# Patient Record
Sex: MALE | Race: WHITE | NOT HISPANIC OR LATINO | Employment: FULL TIME | URBAN - METROPOLITAN AREA
[De-identification: names, ages, dates, MRNs, and addresses within clinical notes are randomized per-mention and may not be internally consistent; named-entity substitution may affect disease eponyms.]

---

## 2017-04-04 ENCOUNTER — RECORDS - HEALTHEAST (OUTPATIENT)
Dept: LAB | Facility: CLINIC | Age: 52
End: 2017-04-04

## 2017-04-05 LAB
1ST CONSTITUENT:: NORMAL
SOURCE: NORMAL

## 2018-01-10 ENCOUNTER — RECORDS - HEALTHEAST (OUTPATIENT)
Dept: LAB | Facility: CLINIC | Age: 53
End: 2018-01-10

## 2018-01-10 LAB
ALBUMIN SERPL-MCNC: 3.8 G/DL (ref 3.5–5)
ALP SERPL-CCNC: 108 U/L (ref 45–120)
ALT SERPL W P-5'-P-CCNC: 25 U/L (ref 0–45)
ANION GAP SERPL CALCULATED.3IONS-SCNC: 11 MMOL/L (ref 5–18)
AST SERPL W P-5'-P-CCNC: 25 U/L (ref 0–40)
BILIRUB SERPL-MCNC: 0.6 MG/DL (ref 0–1)
BUN SERPL-MCNC: 16 MG/DL (ref 8–22)
CALCIUM SERPL-MCNC: 9.8 MG/DL (ref 8.5–10.5)
CHLORIDE BLD-SCNC: 106 MMOL/L (ref 98–107)
CHOLEST SERPL-MCNC: 181 MG/DL
CO2 SERPL-SCNC: 26 MMOL/L (ref 22–31)
CREAT SERPL-MCNC: 0.97 MG/DL (ref 0.7–1.3)
FASTING STATUS PATIENT QL REPORTED: NORMAL
GFR SERPL CREATININE-BSD FRML MDRD: >60 ML/MIN/1.73M2
GLUCOSE BLD-MCNC: 90 MG/DL (ref 70–125)
HDLC SERPL-MCNC: 40 MG/DL
LDLC SERPL CALC-MCNC: 114 MG/DL
POTASSIUM BLD-SCNC: 4.1 MMOL/L (ref 3.5–5)
PROT SERPL-MCNC: 7.8 G/DL (ref 6–8)
SODIUM SERPL-SCNC: 143 MMOL/L (ref 136–145)
TRIGL SERPL-MCNC: 134 MG/DL
URATE SERPL-MCNC: 7.4 MG/DL (ref 3–8)

## 2018-06-21 ENCOUNTER — RECORDS - HEALTHEAST (OUTPATIENT)
Dept: LAB | Facility: CLINIC | Age: 53
End: 2018-06-21

## 2018-06-21 ENCOUNTER — TRANSFERRED RECORDS (OUTPATIENT)
Dept: HEALTH INFORMATION MANAGEMENT | Facility: CLINIC | Age: 53
End: 2018-06-21

## 2018-06-21 LAB
ALBUMIN SERPL-MCNC: 3.9 G/DL (ref 3.5–5)
ALP SERPL-CCNC: 99 U/L (ref 45–120)
ALT SERPL W P-5'-P-CCNC: 78 U/L (ref 0–45)
ANION GAP SERPL CALCULATED.3IONS-SCNC: 11 MMOL/L (ref 5–18)
AST SERPL W P-5'-P-CCNC: 33 U/L (ref 0–40)
BILIRUB SERPL-MCNC: 0.4 MG/DL (ref 0–1)
BUN SERPL-MCNC: 13 MG/DL (ref 8–22)
CALCIUM SERPL-MCNC: 9.6 MG/DL (ref 8.5–10.5)
CHLORIDE BLD-SCNC: 106 MMOL/L (ref 98–107)
CO2 SERPL-SCNC: 22 MMOL/L (ref 22–31)
CREAT SERPL-MCNC: 0.88 MG/DL (ref 0.7–1.3)
GFR SERPL CREATININE-BSD FRML MDRD: >60 ML/MIN/1.73M2
GLUCOSE BLD-MCNC: 101 MG/DL (ref 70–125)
POTASSIUM BLD-SCNC: 4 MMOL/L (ref 3.5–5)
PROT SERPL-MCNC: 7.6 G/DL (ref 6–8)
SODIUM SERPL-SCNC: 139 MMOL/L (ref 136–145)
TSH SERPL DL<=0.005 MIU/L-ACNC: 0.6 UIU/ML (ref 0.3–5)

## 2018-06-23 LAB — ALDOST SERPL-MCNC: 13.6 NG/DL

## 2018-06-24 LAB
ANNOTATION COMMENT IMP: NORMAL
METANEPHS SERPL-SCNC: 0.26 NMOL/L (ref 0–0.49)
NORMETANEPHRINE SERPL-SCNC: 0.61 NMOL/L (ref 0–0.89)

## 2018-07-17 ENCOUNTER — RECORDS - HEALTHEAST (OUTPATIENT)
Dept: LAB | Facility: CLINIC | Age: 53
End: 2018-07-17

## 2018-07-17 LAB
ANION GAP SERPL CALCULATED.3IONS-SCNC: 12 MMOL/L (ref 5–18)
BUN SERPL-MCNC: 23 MG/DL (ref 8–22)
CALCIUM SERPL-MCNC: 10 MG/DL (ref 8.5–10.5)
CHLORIDE BLD-SCNC: 106 MMOL/L (ref 98–107)
CO2 SERPL-SCNC: 24 MMOL/L (ref 22–31)
CREAT SERPL-MCNC: 1.14 MG/DL (ref 0.7–1.3)
GFR SERPL CREATININE-BSD FRML MDRD: >60 ML/MIN/1.73M2
GLUCOSE BLD-MCNC: 97 MG/DL (ref 70–125)
POTASSIUM BLD-SCNC: 4 MMOL/L (ref 3.5–5)
SODIUM SERPL-SCNC: 142 MMOL/L (ref 136–145)

## 2019-01-22 ENCOUNTER — RECORDS - HEALTHEAST (OUTPATIENT)
Dept: LAB | Facility: CLINIC | Age: 54
End: 2019-01-22

## 2019-01-22 LAB
ALBUMIN SERPL-MCNC: 4 G/DL (ref 3.5–5)
ALP SERPL-CCNC: 82 U/L (ref 45–120)
ALT SERPL W P-5'-P-CCNC: 71 U/L (ref 0–45)
ANION GAP SERPL CALCULATED.3IONS-SCNC: 13 MMOL/L (ref 5–18)
AST SERPL W P-5'-P-CCNC: 36 U/L (ref 0–40)
BILIRUB SERPL-MCNC: 0.4 MG/DL (ref 0–1)
BUN SERPL-MCNC: 21 MG/DL (ref 8–22)
CALCIUM SERPL-MCNC: 9.8 MG/DL (ref 8.5–10.5)
CHLORIDE BLD-SCNC: 106 MMOL/L (ref 98–107)
CHOLEST SERPL-MCNC: 215 MG/DL
CO2 SERPL-SCNC: 25 MMOL/L (ref 22–31)
CREAT SERPL-MCNC: 1.21 MG/DL (ref 0.7–1.3)
FASTING STATUS PATIENT QL REPORTED: ABNORMAL
GFR SERPL CREATININE-BSD FRML MDRD: >60 ML/MIN/1.73M2
GLUCOSE BLD-MCNC: 87 MG/DL (ref 70–125)
HDLC SERPL-MCNC: 35 MG/DL
LDLC SERPL CALC-MCNC: 111 MG/DL
MAGNESIUM SERPL-MCNC: 2.5 MG/DL (ref 1.8–2.6)
POTASSIUM BLD-SCNC: 4 MMOL/L (ref 3.5–5)
PROT SERPL-MCNC: 7.8 G/DL (ref 6–8)
PSA SERPL-MCNC: 0.5 NG/ML (ref 0–3.5)
SODIUM SERPL-SCNC: 144 MMOL/L (ref 136–145)
TRIGL SERPL-MCNC: 346 MG/DL
URATE SERPL-MCNC: 11.1 MG/DL (ref 3–8)

## 2019-04-26 ENCOUNTER — RECORDS - HEALTHEAST (OUTPATIENT)
Dept: LAB | Facility: CLINIC | Age: 54
End: 2019-04-26

## 2019-04-26 LAB
ANION GAP SERPL CALCULATED.3IONS-SCNC: 11 MMOL/L (ref 5–18)
BUN SERPL-MCNC: 20 MG/DL (ref 8–22)
CALCIUM SERPL-MCNC: 10.2 MG/DL (ref 8.5–10.5)
CHLORIDE BLD-SCNC: 106 MMOL/L (ref 98–107)
CO2 SERPL-SCNC: 27 MMOL/L (ref 22–31)
CREAT SERPL-MCNC: 1.3 MG/DL (ref 0.7–1.3)
GFR SERPL CREATININE-BSD FRML MDRD: 57 ML/MIN/1.73M2
GLUCOSE BLD-MCNC: 89 MG/DL (ref 70–125)
POTASSIUM BLD-SCNC: 4.4 MMOL/L (ref 3.5–5)
SODIUM SERPL-SCNC: 144 MMOL/L (ref 136–145)

## 2019-05-28 ENCOUNTER — RECORDS - HEALTHEAST (OUTPATIENT)
Dept: LAB | Facility: CLINIC | Age: 54
End: 2019-05-28

## 2019-05-28 LAB — URATE SERPL-MCNC: 10.1 MG/DL (ref 3–8)

## 2019-11-08 ENCOUNTER — TRANSFERRED RECORDS (OUTPATIENT)
Dept: HEALTH INFORMATION MANAGEMENT | Facility: CLINIC | Age: 54
End: 2019-11-08

## 2021-04-26 ENCOUNTER — RECORDS - HEALTHEAST (OUTPATIENT)
Dept: LAB | Facility: CLINIC | Age: 56
End: 2021-04-26

## 2021-04-26 LAB
ANION GAP SERPL CALCULATED.3IONS-SCNC: 14 MMOL/L (ref 5–18)
BUN SERPL-MCNC: 20 MG/DL (ref 8–22)
CALCIUM SERPL-MCNC: 9.5 MG/DL (ref 8.5–10.5)
CHLORIDE BLD-SCNC: 103 MMOL/L (ref 98–107)
CHOLEST SERPL-MCNC: 199 MG/DL
CO2 SERPL-SCNC: 22 MMOL/L (ref 22–31)
CREAT SERPL-MCNC: 1.13 MG/DL (ref 0.7–1.3)
FASTING STATUS PATIENT QL REPORTED: ABNORMAL
GFR SERPL CREATININE-BSD FRML MDRD: >60 ML/MIN/1.73M2
GLUCOSE BLD-MCNC: 131 MG/DL (ref 70–125)
HDLC SERPL-MCNC: 37 MG/DL
LDLC SERPL CALC-MCNC: 114 MG/DL
POTASSIUM BLD-SCNC: 3.5 MMOL/L (ref 3.5–5)
PSA SERPL-MCNC: 0.8 NG/ML (ref 0–3.5)
SODIUM SERPL-SCNC: 139 MMOL/L (ref 136–145)
TRIGL SERPL-MCNC: 240 MG/DL
URATE SERPL-MCNC: 6 MG/DL (ref 3–8)

## 2022-04-11 ENCOUNTER — TRANSFERRED RECORDS (OUTPATIENT)
Dept: HEALTH INFORMATION MANAGEMENT | Facility: CLINIC | Age: 57
End: 2022-04-11

## 2022-04-11 ENCOUNTER — LAB REQUISITION (OUTPATIENT)
Dept: LAB | Facility: CLINIC | Age: 57
End: 2022-04-11

## 2022-04-11 DIAGNOSIS — Z12.5 ENCOUNTER FOR SCREENING FOR MALIGNANT NEOPLASM OF PROSTATE: ICD-10-CM

## 2022-04-11 DIAGNOSIS — Z87.39 PERSONAL HISTORY OF OTHER DISEASES OF THE MUSCULOSKELETAL SYSTEM AND CONNECTIVE TISSUE: ICD-10-CM

## 2022-04-11 DIAGNOSIS — I12.9 HYPERTENSIVE CHRONIC KIDNEY DISEASE WITH STAGE 1 THROUGH STAGE 4 CHRONIC KIDNEY DISEASE, OR UNSPECIFIED CHRONIC KIDNEY DISEASE: ICD-10-CM

## 2022-04-11 LAB
ALBUMIN SERPL-MCNC: 3.9 G/DL (ref 3.5–5)
ALP SERPL-CCNC: 96 U/L (ref 45–120)
ALT SERPL W P-5'-P-CCNC: 27 U/L (ref 0–45)
ANION GAP SERPL CALCULATED.3IONS-SCNC: 9 MMOL/L (ref 5–18)
AST SERPL W P-5'-P-CCNC: 26 U/L (ref 0–40)
BILIRUB SERPL-MCNC: 0.5 MG/DL (ref 0–1)
BUN SERPL-MCNC: 21 MG/DL (ref 8–22)
CALCIUM SERPL-MCNC: 10 MG/DL (ref 8.5–10.5)
CHLORIDE BLD-SCNC: 102 MMOL/L (ref 98–107)
CHOLEST SERPL-MCNC: 178 MG/DL
CO2 SERPL-SCNC: 29 MMOL/L (ref 22–31)
CREAT SERPL-MCNC: 1.13 MG/DL (ref 0.7–1.3)
FASTING STATUS PATIENT QL REPORTED: ABNORMAL
GFR SERPL CREATININE-BSD FRML MDRD: 75 ML/MIN/1.73M2
GLUCOSE BLD-MCNC: 123 MG/DL (ref 70–125)
HDLC SERPL-MCNC: 37 MG/DL
LDLC SERPL CALC-MCNC: 83 MG/DL
POTASSIUM BLD-SCNC: 4.2 MMOL/L (ref 3.5–5)
PROT SERPL-MCNC: 7.3 G/DL (ref 6–8)
PSA SERPL-MCNC: 0.55 UG/L (ref 0–3.5)
SODIUM SERPL-SCNC: 140 MMOL/L (ref 136–145)
TRIGL SERPL-MCNC: 292 MG/DL
URATE SERPL-MCNC: 5.8 MG/DL (ref 3–8)

## 2022-04-11 PROCEDURE — G0103 PSA SCREENING: HCPCS | Performed by: FAMILY MEDICINE

## 2022-04-11 PROCEDURE — 80053 COMPREHEN METABOLIC PANEL: CPT | Performed by: FAMILY MEDICINE

## 2022-04-11 PROCEDURE — 84550 ASSAY OF BLOOD/URIC ACID: CPT | Performed by: FAMILY MEDICINE

## 2022-04-11 PROCEDURE — 80061 LIPID PANEL: CPT | Performed by: FAMILY MEDICINE

## 2022-11-06 ENCOUNTER — HOSPITAL ENCOUNTER (INPATIENT)
Facility: HOSPITAL | Age: 57
LOS: 1 days | Discharge: HOME | End: 2022-11-08
Attending: EMERGENCY MEDICINE | Admitting: HOSPITALIST
Payer: COMMERCIAL

## 2022-11-06 ENCOUNTER — APPOINTMENT (OUTPATIENT)
Dept: RADIOLOGY | Facility: HOSPITAL | Age: 57
End: 2022-11-06
Attending: EMERGENCY MEDICINE
Payer: COMMERCIAL

## 2022-11-06 DIAGNOSIS — I21.3 ST ELEVATION MI (STEMI) (H): ICD-10-CM

## 2022-11-06 DIAGNOSIS — I21.3 ST ELEVATION MYOCARDIAL INFARCTION (STEMI), UNSPECIFIED ARTERY (H): ICD-10-CM

## 2022-11-06 LAB
ACT BLD: 237 SECONDS (ref 74–150)
ACT BLD: 254 SECONDS (ref 74–150)
ACT BLD: 297 SECONDS (ref 74–150)
ACT BLD: 318 SECONDS (ref 74–150)
ANION GAP SERPL CALCULATED.3IONS-SCNC: 12 MMOL/L (ref 7–15)
ANION GAP SERPL CALCULATED.3IONS-SCNC: 13 MMOL/L (ref 7–15)
APTT PPP: 80 SECONDS (ref 22–38)
BASOPHILS # BLD AUTO: 0 10E3/UL (ref 0–0.2)
BASOPHILS # BLD AUTO: 0.1 10E3/UL (ref 0–0.2)
BASOPHILS NFR BLD AUTO: 0 %
BASOPHILS NFR BLD AUTO: 0 %
BUN SERPL-MCNC: 16.1 MG/DL (ref 6–20)
BUN SERPL-MCNC: 16.6 MG/DL (ref 6–20)
CALCIUM SERPL-MCNC: 9.1 MG/DL (ref 8.6–10)
CALCIUM SERPL-MCNC: 9.5 MG/DL (ref 8.6–10)
CHLORIDE SERPL-SCNC: 100 MMOL/L (ref 98–107)
CHLORIDE SERPL-SCNC: 100 MMOL/L (ref 98–107)
CREAT SERPL-MCNC: 0.98 MG/DL (ref 0.67–1.17)
CREAT SERPL-MCNC: 1.09 MG/DL (ref 0.67–1.17)
DEPRECATED HCO3 PLAS-SCNC: 24 MMOL/L (ref 22–29)
DEPRECATED HCO3 PLAS-SCNC: 24 MMOL/L (ref 22–29)
EOSINOPHIL # BLD AUTO: 0 10E3/UL (ref 0–0.7)
EOSINOPHIL # BLD AUTO: 0 10E3/UL (ref 0–0.7)
EOSINOPHIL NFR BLD AUTO: 0 %
EOSINOPHIL NFR BLD AUTO: 0 %
ERYTHROCYTE [DISTWIDTH] IN BLOOD BY AUTOMATED COUNT: 12.4 % (ref 10–15)
ERYTHROCYTE [DISTWIDTH] IN BLOOD BY AUTOMATED COUNT: 12.7 % (ref 10–15)
GFR SERPL CREATININE-BSD FRML MDRD: 79 ML/MIN/1.73M2
GFR SERPL CREATININE-BSD FRML MDRD: 89 ML/MIN/1.73M2
GLUCOSE SERPL-MCNC: 122 MG/DL (ref 70–99)
GLUCOSE SERPL-MCNC: 149 MG/DL (ref 70–99)
HCT VFR BLD AUTO: 41.7 % (ref 40–53)
HCT VFR BLD AUTO: 45.3 % (ref 40–53)
HGB BLD-MCNC: 14 G/DL (ref 13.3–17.7)
HGB BLD-MCNC: 15.6 G/DL (ref 13.3–17.7)
HOLD SPECIMEN: NORMAL
HOLD SPECIMEN: NORMAL
IMM GRANULOCYTES # BLD: 0 10E3/UL
IMM GRANULOCYTES # BLD: 0.1 10E3/UL
IMM GRANULOCYTES NFR BLD: 0 %
IMM GRANULOCYTES NFR BLD: 1 %
INR PPP: 1.15 (ref 0.85–1.15)
LYMPHOCYTES # BLD AUTO: 1.9 10E3/UL (ref 0.8–5.3)
LYMPHOCYTES # BLD AUTO: 2.3 10E3/UL (ref 0.8–5.3)
LYMPHOCYTES NFR BLD AUTO: 12 %
LYMPHOCYTES NFR BLD AUTO: 16 %
MAGNESIUM SERPL-MCNC: 1.7 MG/DL (ref 1.7–2.3)
MCH RBC QN AUTO: 30.6 PG (ref 26.5–33)
MCH RBC QN AUTO: 30.8 PG (ref 26.5–33)
MCHC RBC AUTO-ENTMCNC: 33.6 G/DL (ref 31.5–36.5)
MCHC RBC AUTO-ENTMCNC: 34.4 G/DL (ref 31.5–36.5)
MCV RBC AUTO: 89 FL (ref 78–100)
MCV RBC AUTO: 92 FL (ref 78–100)
MONOCYTES # BLD AUTO: 0.6 10E3/UL (ref 0–1.3)
MONOCYTES # BLD AUTO: 1.2 10E3/UL (ref 0–1.3)
MONOCYTES NFR BLD AUTO: 5 %
MONOCYTES NFR BLD AUTO: 6 %
NEUTROPHILS # BLD AUTO: 14.8 10E3/UL (ref 1.6–8.3)
NEUTROPHILS # BLD AUTO: 9.3 10E3/UL (ref 1.6–8.3)
NEUTROPHILS NFR BLD AUTO: 79 %
NEUTROPHILS NFR BLD AUTO: 81 %
NRBC # BLD AUTO: 0 10E3/UL
NRBC # BLD AUTO: 0 10E3/UL
NRBC BLD AUTO-RTO: 0 /100
NRBC BLD AUTO-RTO: 0 /100
PLATELET # BLD AUTO: 239 10E3/UL (ref 150–450)
PLATELET # BLD AUTO: 252 10E3/UL (ref 150–450)
POTASSIUM SERPL-SCNC: 3.3 MMOL/L (ref 3.4–5.3)
POTASSIUM SERPL-SCNC: 3.5 MMOL/L (ref 3.4–5.3)
RBC # BLD AUTO: 4.55 10E6/UL (ref 4.4–5.9)
RBC # BLD AUTO: 5.09 10E6/UL (ref 4.4–5.9)
SODIUM SERPL-SCNC: 136 MMOL/L (ref 136–145)
SODIUM SERPL-SCNC: 137 MMOL/L (ref 136–145)
TROPONIN T SERPL HS-MCNC: 115 NG/L
TROPONIN T SERPL HS-MCNC: 312 NG/L
TROPONIN T SERPL HS-MCNC: 39 NG/L
TROPONIN T SERPL HS-MCNC: 972 NG/L
WBC # BLD AUTO: 11.9 10E3/UL (ref 4–11)
WBC # BLD AUTO: 18.3 10E3/UL (ref 4–11)

## 2022-11-06 PROCEDURE — 36415 COLL VENOUS BLD VENIPUNCTURE: CPT | Performed by: EMERGENCY MEDICINE

## 2022-11-06 PROCEDURE — 84484 ASSAY OF TROPONIN QUANT: CPT | Performed by: EMERGENCY MEDICINE

## 2022-11-06 PROCEDURE — 99223 1ST HOSP IP/OBS HIGH 75: CPT | Mod: AI | Performed by: HOSPITALIST

## 2022-11-06 PROCEDURE — 92941 PRQ TRLML REVSC TOT OCCL AMI: CPT | Mod: RC | Performed by: INTERNAL MEDICINE

## 2022-11-06 PROCEDURE — 250N000011 HC RX IP 250 OP 636: Performed by: EMERGENCY MEDICINE

## 2022-11-06 PROCEDURE — 93458 L HRT ARTERY/VENTRICLE ANGIO: CPT | Mod: XU | Performed by: INTERNAL MEDICINE

## 2022-11-06 PROCEDURE — 84484 ASSAY OF TROPONIN QUANT: CPT | Performed by: INTERNAL MEDICINE

## 2022-11-06 PROCEDURE — 85347 COAGULATION TIME ACTIVATED: CPT

## 2022-11-06 PROCEDURE — 4A023N7 MEASUREMENT OF CARDIAC SAMPLING AND PRESSURE, LEFT HEART, PERCUTANEOUS APPROACH: ICD-10-PCS | Performed by: INTERNAL MEDICINE

## 2022-11-06 PROCEDURE — 250N000013 HC RX MED GY IP 250 OP 250 PS 637: Performed by: EMERGENCY MEDICINE

## 2022-11-06 PROCEDURE — 93005 ELECTROCARDIOGRAM TRACING: CPT | Performed by: STUDENT IN AN ORGANIZED HEALTH CARE EDUCATION/TRAINING PROGRAM

## 2022-11-06 PROCEDURE — 250N000009 HC RX 250: Performed by: INTERNAL MEDICINE

## 2022-11-06 PROCEDURE — 255N000002 HC RX 255 OP 636: Performed by: INTERNAL MEDICINE

## 2022-11-06 PROCEDURE — 93010 ELECTROCARDIOGRAM REPORT: CPT | Performed by: INTERNAL MEDICINE

## 2022-11-06 PROCEDURE — C1887 CATHETER, GUIDING: HCPCS | Performed by: INTERNAL MEDICINE

## 2022-11-06 PROCEDURE — 92978 ENDOLUMINL IVUS OCT C 1ST: CPT | Mod: 26 | Performed by: INTERNAL MEDICINE

## 2022-11-06 PROCEDURE — 83735 ASSAY OF MAGNESIUM: CPT | Performed by: HOSPITALIST

## 2022-11-06 PROCEDURE — 71045 X-RAY EXAM CHEST 1 VIEW: CPT

## 2022-11-06 PROCEDURE — C9606 PERC D-E COR REVASC W AMI S: HCPCS | Mod: RC | Performed by: INTERNAL MEDICINE

## 2022-11-06 PROCEDURE — 93005 ELECTROCARDIOGRAM TRACING: CPT | Performed by: EMERGENCY MEDICINE

## 2022-11-06 PROCEDURE — C1753 CATH, INTRAVAS ULTRASOUND: HCPCS | Performed by: INTERNAL MEDICINE

## 2022-11-06 PROCEDURE — B241ZZ3 ULTRASONOGRAPHY OF MULTIPLE CORONARY ARTERIES, INTRAVASCULAR: ICD-10-PCS | Performed by: INTERNAL MEDICINE

## 2022-11-06 PROCEDURE — 99152 MOD SED SAME PHYS/QHP 5/>YRS: CPT | Performed by: INTERNAL MEDICINE

## 2022-11-06 PROCEDURE — 93005 ELECTROCARDIOGRAM TRACING: CPT | Performed by: INTERNAL MEDICINE

## 2022-11-06 PROCEDURE — C1725 CATH, TRANSLUMIN NON-LASER: HCPCS | Performed by: INTERNAL MEDICINE

## 2022-11-06 PROCEDURE — G0378 HOSPITAL OBSERVATION PER HR: HCPCS

## 2022-11-06 PROCEDURE — 250N000011 HC RX IP 250 OP 636: Performed by: INTERNAL MEDICINE

## 2022-11-06 PROCEDURE — C9803 HOPD COVID-19 SPEC COLLECT: HCPCS

## 2022-11-06 PROCEDURE — 36415 COLL VENOUS BLD VENIPUNCTURE: CPT | Performed by: INTERNAL MEDICINE

## 2022-11-06 PROCEDURE — C1757 CATH, THROMBECTOMY/EMBOLECT: HCPCS | Performed by: INTERNAL MEDICINE

## 2022-11-06 PROCEDURE — 93010 ELECTROCARDIOGRAM REPORT: CPT | Mod: 76 | Performed by: INTERNAL MEDICINE

## 2022-11-06 PROCEDURE — 99285 EMERGENCY DEPT VISIT HI MDM: CPT | Mod: 25

## 2022-11-06 PROCEDURE — C1894 INTRO/SHEATH, NON-LASER: HCPCS | Performed by: INTERNAL MEDICINE

## 2022-11-06 PROCEDURE — 99153 MOD SED SAME PHYS/QHP EA: CPT | Performed by: INTERNAL MEDICINE

## 2022-11-06 PROCEDURE — 85025 COMPLETE CBC W/AUTO DIFF WBC: CPT | Performed by: EMERGENCY MEDICINE

## 2022-11-06 PROCEDURE — C1769 GUIDE WIRE: HCPCS | Performed by: INTERNAL MEDICINE

## 2022-11-06 PROCEDURE — 93458 L HRT ARTERY/VENTRICLE ANGIO: CPT | Performed by: INTERNAL MEDICINE

## 2022-11-06 PROCEDURE — 272N000001 HC OR GENERAL SUPPLY STERILE: Performed by: INTERNAL MEDICINE

## 2022-11-06 PROCEDURE — 85610 PROTHROMBIN TIME: CPT | Performed by: EMERGENCY MEDICINE

## 2022-11-06 PROCEDURE — 36415 COLL VENOUS BLD VENIPUNCTURE: CPT | Performed by: HOSPITALIST

## 2022-11-06 PROCEDURE — 85730 THROMBOPLASTIN TIME PARTIAL: CPT | Performed by: EMERGENCY MEDICINE

## 2022-11-06 PROCEDURE — 027035Z DILATION OF CORONARY ARTERY, ONE ARTERY WITH TWO DRUG-ELUTING INTRALUMINAL DEVICES, PERCUTANEOUS APPROACH: ICD-10-PCS | Performed by: INTERNAL MEDICINE

## 2022-11-06 PROCEDURE — 82310 ASSAY OF CALCIUM: CPT | Performed by: EMERGENCY MEDICINE

## 2022-11-06 PROCEDURE — 250N000011 HC RX IP 250 OP 636: Performed by: HOSPITALIST

## 2022-11-06 PROCEDURE — 93005 ELECTROCARDIOGRAM TRACING: CPT

## 2022-11-06 PROCEDURE — 92978 ENDOLUMINL IVUS OCT C 1ST: CPT | Mod: RC | Performed by: INTERNAL MEDICINE

## 2022-11-06 PROCEDURE — 250N000013 HC RX MED GY IP 250 OP 250 PS 637: Performed by: INTERNAL MEDICINE

## 2022-11-06 PROCEDURE — B211YZZ FLUOROSCOPY OF MULTIPLE CORONARY ARTERIES USING OTHER CONTRAST: ICD-10-PCS | Performed by: INTERNAL MEDICINE

## 2022-11-06 PROCEDURE — 96374 THER/PROPH/DIAG INJ IV PUSH: CPT

## 2022-11-06 PROCEDURE — C1874 STENT, COATED/COV W/DEL SYS: HCPCS | Performed by: INTERNAL MEDICINE

## 2022-11-06 PROCEDURE — 02C03ZZ EXTIRPATION OF MATTER FROM CORONARY ARTERY, ONE ARTERY, PERCUTANEOUS APPROACH: ICD-10-PCS | Performed by: INTERNAL MEDICINE

## 2022-11-06 DEVICE — IMPLANTABLE DEVICE: Type: IMPLANTABLE DEVICE | Status: FUNCTIONAL

## 2022-11-06 RX ORDER — ATORVASTATIN CALCIUM 40 MG/1
80 TABLET, FILM COATED ORAL DAILY
Status: DISCONTINUED | OUTPATIENT
Start: 2022-11-06 | End: 2022-11-08 | Stop reason: HOSPADM

## 2022-11-06 RX ORDER — NALOXONE HYDROCHLORIDE 0.4 MG/ML
0.2 INJECTION, SOLUTION INTRAMUSCULAR; INTRAVENOUS; SUBCUTANEOUS
Status: ACTIVE | OUTPATIENT
Start: 2022-11-06 | End: 2022-11-06

## 2022-11-06 RX ORDER — ONDANSETRON 4 MG/1
4 TABLET, ORALLY DISINTEGRATING ORAL EVERY 6 HOURS PRN
Status: DISCONTINUED | OUTPATIENT
Start: 2022-11-06 | End: 2022-11-08 | Stop reason: HOSPADM

## 2022-11-06 RX ORDER — EPTIFIBATIDE 2 MG/ML
INJECTION, SOLUTION INTRAVENOUS
Status: DISCONTINUED | OUTPATIENT
Start: 2022-11-06 | End: 2022-11-06

## 2022-11-06 RX ORDER — NITROGLYCERIN 0.4 MG/1
TABLET SUBLINGUAL
Qty: 25 TABLET | Refills: 3 | Status: SHIPPED | OUTPATIENT
Start: 2022-11-06 | End: 2022-11-14

## 2022-11-06 RX ORDER — ADENOSINE 3 MG/ML
INJECTION, SOLUTION INTRAVENOUS
Status: DISCONTINUED | OUTPATIENT
Start: 2022-11-06 | End: 2022-11-06

## 2022-11-06 RX ORDER — SODIUM CHLORIDE 9 MG/ML
INJECTION, SOLUTION INTRAVENOUS CONTINUOUS
Status: ACTIVE | OUTPATIENT
Start: 2022-11-06 | End: 2022-11-06

## 2022-11-06 RX ORDER — FLUMAZENIL 0.1 MG/ML
0.2 INJECTION, SOLUTION INTRAVENOUS
Status: ACTIVE | OUTPATIENT
Start: 2022-11-06 | End: 2022-11-06

## 2022-11-06 RX ORDER — ATORVASTATIN CALCIUM 80 MG/1
80 TABLET, FILM COATED ORAL DAILY
Qty: 90 TABLET | Refills: 3 | Status: SHIPPED | OUTPATIENT
Start: 2022-11-06 | End: 2022-11-14

## 2022-11-06 RX ORDER — EPTIFIBATIDE 2 MG/ML
2 INJECTION, SOLUTION INTRAVENOUS CONTINUOUS
Status: ACTIVE | OUTPATIENT
Start: 2022-11-06 | End: 2022-11-07

## 2022-11-06 RX ORDER — POTASSIUM CHLORIDE 7.45 MG/ML
10 INJECTION INTRAVENOUS
Status: DISCONTINUED | OUTPATIENT
Start: 2022-11-06 | End: 2022-11-06

## 2022-11-06 RX ORDER — LISINOPRIL AND HYDROCHLOROTHIAZIDE 12.5; 2 MG/1; MG/1
2 TABLET ORAL DAILY
Status: ON HOLD | COMMUNITY
Start: 2022-10-15 | End: 2022-11-08

## 2022-11-06 RX ORDER — ASPIRIN 81 MG/1
81 TABLET ORAL DAILY
Status: DISCONTINUED | OUTPATIENT
Start: 2022-11-07 | End: 2022-11-08 | Stop reason: HOSPADM

## 2022-11-06 RX ORDER — HEPARIN SODIUM 1000 [USP'U]/ML
INJECTION, SOLUTION INTRAVENOUS; SUBCUTANEOUS
Status: DISCONTINUED | OUTPATIENT
Start: 2022-11-06 | End: 2022-11-06

## 2022-11-06 RX ORDER — NITROGLYCERIN 0.4 MG/1
0.4 TABLET SUBLINGUAL EVERY 5 MIN PRN
Status: DISCONTINUED | OUTPATIENT
Start: 2022-11-06 | End: 2022-11-06

## 2022-11-06 RX ORDER — ONDANSETRON 2 MG/ML
4 INJECTION INTRAMUSCULAR; INTRAVENOUS EVERY 6 HOURS PRN
Status: DISCONTINUED | OUTPATIENT
Start: 2022-11-06 | End: 2022-11-08 | Stop reason: HOSPADM

## 2022-11-06 RX ORDER — ACETAMINOPHEN 325 MG/1
650 TABLET ORAL EVERY 4 HOURS PRN
Status: DISCONTINUED | OUTPATIENT
Start: 2022-11-06 | End: 2022-11-08 | Stop reason: HOSPADM

## 2022-11-06 RX ORDER — ASPIRIN 81 MG/1
81 TABLET, CHEWABLE ORAL ONCE
Status: DISCONTINUED | OUTPATIENT
Start: 2022-11-06 | End: 2022-11-06 | Stop reason: CLARIF

## 2022-11-06 RX ORDER — ASPIRIN 81 MG/1
81 TABLET, CHEWABLE ORAL DAILY
Qty: 30 TABLET | Refills: 3 | Status: SHIPPED | OUTPATIENT
Start: 2022-11-06

## 2022-11-06 RX ORDER — ALLOPURINOL 300 MG/1
1 TABLET ORAL DAILY
COMMUNITY
Start: 2022-10-03

## 2022-11-06 RX ORDER — MAGNESIUM SULFATE HEPTAHYDRATE 40 MG/ML
2 INJECTION, SOLUTION INTRAVENOUS ONCE
Status: COMPLETED | OUTPATIENT
Start: 2022-11-06 | End: 2022-11-07

## 2022-11-06 RX ORDER — FENTANYL CITRATE 50 UG/ML
25 INJECTION, SOLUTION INTRAMUSCULAR; INTRAVENOUS
Status: DISCONTINUED | OUTPATIENT
Start: 2022-11-06 | End: 2022-11-08 | Stop reason: HOSPADM

## 2022-11-06 RX ORDER — FENTANYL CITRATE 50 UG/ML
INJECTION, SOLUTION INTRAMUSCULAR; INTRAVENOUS
Status: DISCONTINUED | OUTPATIENT
Start: 2022-11-06 | End: 2022-11-06

## 2022-11-06 RX ORDER — ASPIRIN 81 MG/1
324 TABLET, CHEWABLE ORAL ONCE
Status: COMPLETED | OUTPATIENT
Start: 2022-11-06 | End: 2022-11-06

## 2022-11-06 RX ORDER — HYDRALAZINE HYDROCHLORIDE 20 MG/ML
10 INJECTION INTRAMUSCULAR; INTRAVENOUS EVERY 4 HOURS PRN
Status: DISCONTINUED | OUTPATIENT
Start: 2022-11-06 | End: 2022-11-08 | Stop reason: HOSPADM

## 2022-11-06 RX ORDER — ASPIRIN 81 MG/1
324 TABLET, CHEWABLE ORAL ONCE
Status: DISCONTINUED | OUTPATIENT
Start: 2022-11-06 | End: 2022-11-06

## 2022-11-06 RX ORDER — POTASSIUM CHLORIDE 1500 MG/1
20 TABLET, EXTENDED RELEASE ORAL ONCE
Status: COMPLETED | OUTPATIENT
Start: 2022-11-07 | End: 2022-11-07

## 2022-11-06 RX ORDER — NALOXONE HYDROCHLORIDE 0.4 MG/ML
0.4 INJECTION, SOLUTION INTRAMUSCULAR; INTRAVENOUS; SUBCUTANEOUS
Status: ACTIVE | OUTPATIENT
Start: 2022-11-06 | End: 2022-11-06

## 2022-11-06 RX ORDER — OXYCODONE HYDROCHLORIDE 5 MG/1
10 TABLET ORAL EVERY 4 HOURS PRN
Status: DISCONTINUED | OUTPATIENT
Start: 2022-11-06 | End: 2022-11-08 | Stop reason: HOSPADM

## 2022-11-06 RX ORDER — NITROGLYCERIN 20 MG/100ML
INJECTION INTRAVENOUS CONTINUOUS PRN
Status: DISCONTINUED | OUTPATIENT
Start: 2022-11-06 | End: 2022-11-06

## 2022-11-06 RX ORDER — NITROGLYCERIN 0.4 MG/1
0.4 TABLET SUBLINGUAL EVERY 5 MIN PRN
Status: DISCONTINUED | OUTPATIENT
Start: 2022-11-06 | End: 2022-11-08 | Stop reason: HOSPADM

## 2022-11-06 RX ORDER — NITROGLYCERIN 5 MG/ML
VIAL (ML) INTRAVENOUS
Status: DISCONTINUED | OUTPATIENT
Start: 2022-11-06 | End: 2022-11-06

## 2022-11-06 RX ORDER — IODIXANOL 320 MG/ML
INJECTION, SOLUTION INTRAVASCULAR
Status: DISCONTINUED | OUTPATIENT
Start: 2022-11-06 | End: 2022-11-06

## 2022-11-06 RX ORDER — METOPROLOL TARTRATE 1 MG/ML
5 INJECTION, SOLUTION INTRAVENOUS
Status: DISCONTINUED | OUTPATIENT
Start: 2022-11-06 | End: 2022-11-08 | Stop reason: HOSPADM

## 2022-11-06 RX ORDER — NITROGLYCERIN 20 MG/100ML
10-200 INJECTION INTRAVENOUS CONTINUOUS
Status: DISCONTINUED | OUTPATIENT
Start: 2022-11-06 | End: 2022-11-07

## 2022-11-06 RX ORDER — ATROPINE SULFATE 0.1 MG/ML
0.5 INJECTION INTRAVENOUS
Status: ACTIVE | OUTPATIENT
Start: 2022-11-06 | End: 2022-11-06

## 2022-11-06 RX ORDER — EPTIFIBATIDE 2 MG/ML
2 INJECTION, SOLUTION INTRAVENOUS CONTINUOUS
Status: DISCONTINUED | OUTPATIENT
Start: 2022-11-06 | End: 2022-11-06

## 2022-11-06 RX ORDER — OXYCODONE HYDROCHLORIDE 5 MG/1
5 TABLET ORAL EVERY 4 HOURS PRN
Status: DISCONTINUED | OUTPATIENT
Start: 2022-11-06 | End: 2022-11-08 | Stop reason: HOSPADM

## 2022-11-06 RX ADMIN — POTASSIUM CHLORIDE 10 MEQ: 7.46 INJECTION, SOLUTION INTRAVENOUS at 22:39

## 2022-11-06 RX ADMIN — TICAGRELOR 180 MG: 90 TABLET ORAL at 13:11

## 2022-11-06 RX ADMIN — ASPIRIN 81 MG 324 MG: 81 TABLET ORAL at 13:05

## 2022-11-06 RX ADMIN — ATORVASTATIN CALCIUM 80 MG: 40 TABLET, FILM COATED ORAL at 21:00

## 2022-11-06 RX ADMIN — TICAGRELOR 90 MG: 90 TABLET ORAL at 21:00

## 2022-11-06 RX ADMIN — NITROGLYCERIN 0.4 MG: 0.4 TABLET SUBLINGUAL at 12:58

## 2022-11-06 ASSESSMENT — ACTIVITIES OF DAILY LIVING (ADL)
ADLS_ACUITY_SCORE: 35

## 2022-11-06 ASSESSMENT — EJECTION FRACTION: EF_VALUE: .32

## 2022-11-06 NOTE — H&P
.  [facility]    History and Physical - Hospitalist Service       Date of Admission:  11/6/2022    Assessment & Plan      Kvng Maharaj is a 57 year old male admitted on 11/6/2022. He has history of hypertension and came in today with chief complaints of chest pain.  Work-up revealed acute inferior and lateral STEMI.  Coronary angiogram showed diffuse mid LAD with a focal moderate to severe mid LAD.    STEMI to inferolateral leads  -Patient presented with acute chest pain today, EKG showed ST elevation in lateral leads and ST depression in aVL and V1-2  -Emergently underwent coronary angiogram which showed moderate to severe mid LAD disease.  The RCA had a thrombotic lesion with evidence of embolization  -Continues to have chest discomfort and admitted to ICU, currently on nitro dripand Integrilin drip  -Continue aspirin, statin, Brilinta  -Complete echocardiogram ordered  -Cardiac rehab    Hypertension - BP currently stable.  -Hold PTA lisinopril-hydrochlorothiazide while pt on nitroglycerin drip     Hyperglycemia  -Check A1c    HypoK - replete     Leukocytosis-unclear, no evidence of any infection  -Recheck in a.m., hold off on antibiotics  -Chest x-ray clear.       Diet: Low Saturated Fat Na <2400 mg    DVT Prophylaxis: SCD  Gonzalez Catheter: Not present  Central Lines: None  Code Status: Full Code      Disposition Plan      Expected Discharge Date: 11/07/2022                The patient's care was discussed with the Patient.    Iram Corriea MD  [facility]  ______________________________________________________________________    Chief Complaint   Chest pain    History is obtained from the patient, chart review    History of Present Illness   Kvng Maharaj is a 57 year old male with history of hypertension presented today with chief complaints of chest pain associated with jaw and tooth numbness.  Patient states this happened when he was rollerskating this  morning.  Pain was severe which subsequently improved with nitroglycerin.  Patient had work-up in the ED with EKG which showed ST elevation lateral leads and ST depression in aVL and V1-2.  He was emergently taken to Cath Lab for coronary angiogram and was found to have moderate to severe mid LAD disease.  The RCA had a distal ulcerated, thrombotic lesion with evidence of embolization to the lateral branches.    Review of Systems    The 10 point Review of Systems is negative other than noted in the HPI or here.     Past Medical History    Hypertension    Past Surgical History   Reviewed and non contributory    Social History   I have reviewed this patient's social history and updated it with pertinent information if needed.       Family History   H/o CAD    Prior to Admission Medications   None     Allergies   No Known Allergies    Physical Exam   Vital Signs: Temp: 97.8  F (36.6  C) Temp src: Oral BP: 121/68 Pulse: 59   Resp: 17 SpO2: 98 % O2 Device: Nasal cannula Oxygen Delivery: 2 LPM  Weight: 183 lbs 3.24 oz  General:  Appears stated age, no acute distress. A&O x 3.  Skin:  Warm, dry. No rashes or lesions on exposed skin.  HEENT:  Normocephalic, atraumatic; EOMs grossly intact.  Neck:  Supple.  Chest:  Breath sounds CTA and no increased work of breathing on room air.  Cardiovascular:  RRR, No peripheral edema.  Abdomen:  Soft, non-tender, non-distended.  Musculoskeletal:  Moves all four extremities. No muscle atrophy.  Neurological:  CN 2-12 grossly intact.    Data   Data reviewed today: I reviewed all medications, new labs and imaging results over the last 24 hours.  I personally reviewed labs     Recent Labs   Lab 11/06/22  1300   WBC 18.3*   HGB 15.6   MCV 89         POTASSIUM 3.3*   CHLORIDE 100   CO2 24   BUN 16.6   CR 1.09   ANIONGAP 13   JENNIFER 9.5   *       Imaging:  Recent Results (from the past 24 hour(s))   XR Chest Port 1 View    Narrative    EXAM: XR CHEST PORT 1 VIEW  LOCATION: [facility]  DATE/TIME: 11/6/2022 1:05 PM    INDICATION: chest pain  COMPARISON: None.      Impression    IMPRESSION: Negative chest.   Cardiac Catheterization    Narrative      Acute inferior and lateral STEMI in a patient with a family history of   premature coronary disease.    Patent left main.    There is diffuse mild LAD disease with a focal moderate-severe mid LAD   lesion. The first diagonal is a large branching system with mild-moderate   disease.    The circumflex is grossly normal.    The RCA is a large ectatic artery with a distal RCA ulcerated and   thrombotic stenosis with evidence of distal emboli to the two lateral PL   branches. We started with thrombectomy, followed by IVUS for stent sizing   and then stenting with overlapping Megatron 5.0 mm SUJEY dilated to 5.5 mm.   There was further distal embolization to the PDA during the stenting.   Intracoronary adenosine 300mcg and ntg 200mg were given through a twin   pass catheter in the distal RCA with some improvement in the distal PDA   flow, but not the PL branches. Integrillin bolus x 2 given and gtt   started, as was a low dose NTG gtt to augment microvascular flow and clot   break down. Chest pain remained 3/10 and did not change during the   procedure. Challenging PCI due to vessel ectasia and heavy thrombus   burden.    LV EDP 11 mmHg.

## 2022-11-06 NOTE — Clinical Note
Ultrasound catheter inserted for high resolution imaging into right coronary artery and distal right coronary artery.

## 2022-11-06 NOTE — Clinical Note
Stent deployed in the distal right coronary artery. Max pressure = 10 ba. Total duration = 30 seconds.

## 2022-11-06 NOTE — ED PROVIDER NOTES
EMERGENCY DEPARTMENT ENCOUNTER      NAME: Kvng Maharaj  AGE: 57 year old male  YOB: 1965  MRN: [id number]  EVALUATION DATE & TIME: 11/6/2022 12:53 PM    PCP: No primary care provider on file.    ED PROVIDER: Terrell Reddy M.D.      No chief complaint on file.        FINAL IMPRESSION:  STEMI      ED COURSE & MEDICAL DECISION MAKING:    Pertinent Labs & Imaging studies reviewed. (See chart for details)  57 year old male presents to the Emergency Department for evaluation of chest pain or shortness of breath.  Symptoms started approximately 1130 while rollerskating.  Patient typically really skis without any symptoms.  Symptoms improved with resting but still ongoing.  EKG obtained in triage area indicated ongoing infarct.  Patient immediately rushed to room 3.  Repeat EKG with obvious inferior lateral ST segment elevations consistent with acute MI.  Code STEMI called.  Immediate interventions followed including nitroglycerin, Brilinta, aspirin, heparin.  Patient appears non toxic with stable vitals signs.     12:53 I met with the patient for the initial interview and physical examination. Discussed plan for treatment and workup in the ED.    1:05 PM.  Patient feeling improved after initial nitroglycerin.  Blood pressure moderating from approximately 150 systolic to 135 systolic.  Chest x-ray reviewed.  This is unremarkable.  May proceed with Brilinta and heparin.  1:12 PM.  Call placed to admitting physician.  1:17 PM.  Patient discussed with interventional cardiology, Dr. Willams  1:25 PM.  Patient discussed with Dr. Jun Schneider.  Agreeable plan for admission.  1:29 PM.  Patient proceeding to Cath Lab.  At the conclusion of the encounter I discussed the results of all of the tests and the disposition. The questions were answered and return precautions provided. The patient or family acknowledged understanding and was agreeable with the care plan.       Presents critical care situation.  Approximately  "20 minutes spent directly involved in patient's care independent of any procedures.      PPE: Provider wore gloves, N95 mask, eye protection    MEDICATIONS GIVEN IN THE EMERGENCY:  Medications - No data to display    NEW PRESCRIPTIONS STARTED AT TODAY'S ER VISIT  New Prescriptions    No medications on file          =================================================================    HPI    Patient information was obtained from: Patient    Use of Intrepreter: N/A         Kvng Maharaj is a 57 year old male with the pertinent medical history of hypertension who presents to the ED for evaluation of chest pain and numbness.    The patient reports he was roller skiing this morning, and around 11AM he noted sternal chest pain radiating into the jaw. He states he has high blood pressure for which he takes lisinopril. He denies any shortness of breath, nausea, or smoking habits. He denies taking any asprin, he states he took acetaminophen.    Patient reports family history of early heart attack. Patient's father had a heart attack at 37.      REVIEW OF SYSTEMS   Constitutional:  Denies fever, chills  Respiratory:  Denies productive cough or increased work of breathing  Cardiovascular:  Denies palpitations Sternal chest pain  GI:  Denies abdominal pain, nausea, vomiting, or change in bowel or bladder habits   Musculoskeletal:  Denies any new muscle/joint swelling  Skin:  Denies rash   Neurologic:  Denies focal weakness  All systems negative except as marked.     PAST MEDICAL HISTORY:  No past medical history on file.    PAST SURGICAL HISTORY:  No past surgical history on file.      CURRENT MEDICATIONS:    No current facility-administered medications for this encounter.  No current outpatient medications on file.    ALLERGIES:  Not on File    FAMILY HISTORY:  No family history on file.    SOCIAL HISTORY:        VITALS:  /79   Pulse 62   Temp 98.4  F (36.9  C)   Resp 20   Ht 1.651 m (5' 5\")   Wt 79.4 kg (175 lb)   " SpO2 96%   BMI 29.12 kg/m         PHYSICAL EXAM    Constitutional:  Awake, alert, in mild apparent distress  HENT:  Normocephalic, Atraumatic. Bilateral external ears normal. Oropharynx moist. Nose normal. Neck- Normal range of motion  Eyes:  PERRL, EOMI with no signs of entrapment, Conjunctiva normal, No discharge.   Respiratory:  Normal breath sounds, No respiratory distress, No wheezing.    Cardiovascular:  Normal heart rate, Normal rhythm, No appreciable rubs or gallops.  Radial pulses full  GI:  Soft, No tenderness, No distension, No palpable masses  Musculoskeletal:  Intact distal pulses, No edema. Good range of motion in all major joints. No tenderness to palpation or major deformities noted.  Integument:  Warm, Dry, No erythema, No rash.   Neurologic:  Alert & oriented, Normal motor function, Normal sensory function, No focal deficits noted.   Psychiatric:  Affect normal, Judgment normal, Mood normal.     LAB:  All pertinent labs reviewed and interpreted.  Results for orders placed or performed during the hospital encounter of 11/06/22   XR Chest Port 1 View     Status: None    Narrative    EXAM: XR CHEST PORT 1 VIEW  LOCATION: [facility]  DATE/TIME: 11/6/2022 1:05 PM    INDICATION: chest pain  COMPARISON: None.      Impression    IMPRESSION: Negative chest.   Basic metabolic panel     Status: Abnormal   Result Value Ref Range    Sodium 137 136 - 145 mmol/L    Potassium 3.3 (L) 3.4 - 5.3 mmol/L    Chloride 100 98 - 107 mmol/L    Carbon Dioxide (CO2) 24 22 - 29 mmol/L    Anion Gap 13 7 - 15 mmol/L    Urea Nitrogen 16.6 6.0 - 20.0 mg/dL    Creatinine 1.09 0.67 - 1.17 mg/dL    Calcium 9.5 8.6 - 10.0 mg/dL    Glucose 149 (H) 70 - 99 mg/dL    GFR Estimate 79 >60 mL/min/1.73m2   Troponin T, High Sensitivity     Status: Abnormal   Result Value Ref Range    Troponin T, High Sensitivity 39 (H) <=22 ng/L   CBC with platelets and differential     Status: Abnormal   Result Value Ref Range     WBC Count 18.3 (H) 4.0 - 11.0 10e3/uL    RBC Count 5.09 4.40 - 5.90 10e6/uL    Hemoglobin 15.6 13.3 - 17.7 g/dL    Hematocrit 45.3 40.0 - 53.0 %    MCV 89 78 - 100 fL    MCH 30.6 26.5 - 33.0 pg    MCHC 34.4 31.5 - 36.5 g/dL    RDW 12.4 10.0 - 15.0 %    Platelet Count 252 150 - 450 10e3/uL    % Neutrophils 81 %    % Lymphocytes 12 %    % Monocytes 6 %    % Eosinophils 0 %    % Basophils 0 %    % Immature Granulocytes 1 %    NRBCs per 100 WBC 0 <1 /100    Absolute Neutrophils 14.8 (H) 1.6 - 8.3 10e3/uL    Absolute Lymphocytes 2.3 0.8 - 5.3 10e3/uL    Absolute Monocytes 1.2 0.0 - 1.3 10e3/uL    Absolute Eosinophils 0.0 0.0 - 0.7 10e3/uL    Absolute Basophils 0.1 0.0 - 0.2 10e3/uL    Absolute Immature Granulocytes 0.1 <=0.4 10e3/uL    Absolute NRBCs 0.0 10e3/uL   [place] Draw     Status: None (In process)    Narrative    The following orders were created for panel order [place] Draw.  Procedure                               Abnormality         Status                     ---------                               -----------         ------                     Extra Blue Top Tube[[id number]]                              In process                 Extra Red Top Tube[[id number]]                               In process                   Please view results for these tests on the individual orders.   CBC with Platelets & Differential     Status: Abnormal    Narrative    The following orders were created for panel order CBC with Platelets & Differential.  Procedure                               Abnormality         Status                     ---------                               -----------         ------                     CBC with platelets and d...[[id number]]  Abnormal            Final result                 Please view results for these tests on the individual orders.     RADIOLOGY:  Reviewed all pertinent imaging. Please see official radiology report.  No orders to display       EKG:    Normal sinus rhythm.  Rate of 64.   Acute ST segment elevation in the inferior lateral leads with ST segment depressions in the anterior leads consistent with acute infarct.  No prior tracing  I have independently reviewed and interpreted the EKG(s) documented above.          I, Driss Shahid, am serving as a scribe to document services personally performed by Terrell Reddy MD, based on my observation and the provider's statements to me. I, Terrell Reddy MD attest that Driss Shahid is acting in a scribe capacity, has observed my performance of the services and has documented them in accordance with my direction.    Terrell Reddy M.D.  Emergency Medicine  [facility] EMERGENCY DEPARTMENT       Terrell Reddy MD  11/06/22 [id number]

## 2022-11-06 NOTE — PHARMACY-ADMISSION MEDICATION HISTORY
"Pharmacy Note - Admission Medication History    _____________________________________________________________________    Prior To Admission (PTA) med list completed and updated in EMR.       PTA Med List   Medication Sig Last Dose     allopurinol (ZYLOPRIM) 300 MG tablet Take 1 tablet by mouth daily 11/6/2022     aspirin (ASA) 81 MG chewable tablet Take 1 tablet (81 mg) by mouth daily Starting tomorrow.      atorvastatin (LIPITOR) 80 MG tablet Take 1 tablet (80 mg) by mouth daily      lisinopril-hydrochlorothiazide (ZESTORETIC) 20-12.5 MG tablet Take 2 tablets by mouth daily 11/6/2022     nitroGLYcerin (NITROSTAT) 0.4 MG sublingual tablet One tablet under the tongue every 5 minutes if needed for chest pain. May repeat every 5 minutes for a maximum of 3 doses in 15 minutes\"      [START ON 11/7/2022] ticagrelor (BRILINTA) 90 MG tablet Take 1 tablet (90 mg) by mouth 2 times daily Dose to start tomorrow morning.        Information source(s): Patient and CareEverywhere/SureScripts  Method of interview communication: in-person    Summary of Changes to PTA Med List  New: -  Discontinued: -  Changed: -    Patient was asked about OTC/herbal products specifically.  PTA med list reflects this.    In the past week, patient estimated taking medication this percent of the time:  greater than 90%.    Allergies were reviewed, assessed, and updated with the patient.      Patient does not use any multi-dose medications prior to admission.    The information provided in this note is only as accurate as the sources available at the time of the update(s).    Thank you for the opportunity to participate in the care of this patient.    Fernanda Nuñez, PharmD, BCPS 11/06/22 4:49 PM         "

## 2022-11-06 NOTE — ED TRIAGE NOTES
Pt arrives with wife. Pt was rollerskiing this morning which is not new for him. Pt states he didn't go far when he developed midsternal cp that radiated into his jaw. Pt also reports some right arm numbness, some left arm numbness along with intermittent dental numbness. EKG done on arrival and roomed immediately to bed 3 with Dr. Reddy at bedside when STEMI was activated.

## 2022-11-06 NOTE — Clinical Note
Stent deployed in the distal right coronary artery. Max pressure = 16 ba. Total duration = 30 seconds.

## 2022-11-06 NOTE — CONSULTS
"  HEART CARE ENCOUNTER CONSULTATON NOTE      [facility]  [phone]      Assessment/Recommendations   Assessment/Plan:  Inferior, inferior-lateral STEMI s/p distal RCA PCI but with distal embolization and ongoing chest discomfort 3/10.   - Admit ICU  - Integrillin and NTG gtts overnight to help open up his microvascular obstructions from clot embolization  - Brillinta BID x 12 months  - Aspirin 81mg and high intensity statin indefinitely  - Echo in AM  - Cardiac rehab phases I and II  - To tele tomorrow if stable  - Home in 48 hours if stable    F/U NP in 2 weeks and Dr. Rodriguez in 6 weeks (use a new patient slot if necessary)       History of Present Illness/Subjective    HPI: Kvng Maharaj is a 57 year old man with hypertension and a family history of early coronary disease in his father (late 30s, smoker). Mr. Maharaj was roller skiing this morning when he acutely developed chest pain and jaw/tooth numbness. He ate and then came to the ED where he was found to have inferior and lateral TJ with ST depressions aVL and V1-2. He was given SL NTG and the pain improved to 3/10. In the cath lab he was found to have mod-severe mid LAD disease. The RCA had a distal ulcerated, thrombotic lesion with evidence of embolization to the lateral PL branches. See the procedure note for details but the distal RCA was stented with overlapping SUJEY x 2 and the embolization with intracoronary adenosine and nitroglycerin and IV integrillin and NTG gtts were started. His chest apin remained 3/10. There were no arrhythmias. LV EDP was 11 mmHg. His SBP would drop to 80mmHG with IC ntg injections but responded well to IV fluids.        Physical Examination  Review of Systems   Vitals: /79   Pulse 62   Temp 98.4  F (36.9  C)   Resp 20   Ht 1.651 m (5' 5\")   Wt 79.4 kg (175 lb)   SpO2 96%   BMI 29.12 kg/m    BMI= Body mass index is 29.12 kg/m .  Wt Readings from Last 3 Encounters:   11/06/22 79.4 kg (175 " lb)       General Appearance:   no distress, normal body habitus   ENT/Mouth: membranes moist, no oral lesions or bleeding gums.      EYES:  no scleral icterus, normal conjunctivae   Neck: no carotid bruits or thyromegaly   Chest/Lungs:   lungs are clear to auscultation, no rales or wheezing, no sternal scar, equal chest wall expansion    Cardiovascular:   Regular. Normal first and second heart sounds with no murmur. No rubs or gallops; the right carotid, radial and posterior tibial pulses are intact and the left carotid, radial and posterior tibial pulses are intact.  Jugular venous pressure is flat, no edema bilaterally    Abdomen:  no organomegaly, masses, bruits, or tenderness; bowel sounds are present   Extremities: no cyanosis or clubbing   Skin: no xanthelasma, warm.    Neurologic: normal  bilateral, no tremors     Psychiatric: alert and oriented x3, calm        Please refer above for cardiac ROS details.        Medical History  Surgical History Family History Social History   No past medical history on file.  No past surgical history on file.  No family history on file.     Social History     Socioeconomic History     Marital status: Not on file     Spouse name: Not on file     Number of children: Not on file     Years of education: Not on file     Highest education level: Not on file   Occupational History     Not on file   Tobacco Use     Smoking status: Not on file     Smokeless tobacco: Not on file   Substance and Sexual Activity     Alcohol use: Not on file     Drug use: Not on file     Sexual activity: Not on file   Other Topics Concern     Not on file   Social History Narrative     Not on file     Social Determinants of Health     Financial Resource Strain: Not on file   Food Insecurity: Not on file   Transportation Needs: Not on file   Physical Activity: Not on file   Stress: Not on file   Social Connections: Not on file   Intimate Partner Violence: Not on file   Housing Stability: Not on file            Medications  Allergies   No current outpatient medications on file.     Not on File       Lab Results    Chemistry/lipid CBC Cardiac Enzymes/BNP/TSH/INR   Recent Labs   Lab Test 04/11/22  1233   CHOL 178   HDL 37*   LDL 83   TRIG 292*     Recent Labs   Lab Test 04/11/22  1233 04/26/21  1049 01/22/19  1107   LDL 83 114 111     Recent Labs   Lab Test 11/06/22  1300      POTASSIUM 3.3*   CHLORIDE 100   CO2 24   *   BUN 16.6   CR 1.09   GFRESTIMATED 79   JENNIFER 9.5     Recent Labs   Lab Test 11/06/22  1300 04/11/22  1233 04/26/21  1049   CR 1.09 1.13 1.13     No results for input(s): A1C in the last 13027 hours.       Recent Labs   Lab Test 11/06/22  1300   WBC 18.3*   HGB 15.6   HCT 45.3   MCV 89        Recent Labs   Lab Test 11/06/22  1300   HGB 15.6    No results for input(s): TROPONINI in the last 21708 hours.  No results for input(s): BNP, NTBNPI, NTBNP in the last 03951 hours.  Recent Labs   Lab Test 06/21/18  1210   TSH 0.60     No results for input(s): INR in the last 18620 hours.     Today's clinic visit entailed:  Review of prior external note(s) from - [facility] information from epic reviewed  60 minutes spent on the date of the encounter doing chart review, review of outside records, review of test results, interpretation of tests, patient visit and documentation   Provider  Link to [facility] Help Grid     The level of medical decision making during this visit was of high complexity.        Mira Rodriguez MD

## 2022-11-06 NOTE — Clinical Note
Single balloon inflation. Max pressure = 12 ba. Total duration = 20 seconds.     Stent balloon re-inflated..

## 2022-11-07 ENCOUNTER — APPOINTMENT (OUTPATIENT)
Dept: CARDIOLOGY | Facility: HOSPITAL | Age: 57
End: 2022-11-07
Attending: INTERNAL MEDICINE
Payer: COMMERCIAL

## 2022-11-07 ENCOUNTER — APPOINTMENT (OUTPATIENT)
Dept: OCCUPATIONAL THERAPY | Facility: HOSPITAL | Age: 57
End: 2022-11-07
Attending: INTERNAL MEDICINE
Payer: COMMERCIAL

## 2022-11-07 PROBLEM — I21.3 ST ELEVATION MI (STEMI) (H): Status: ACTIVE | Noted: 2022-11-07

## 2022-11-07 LAB
ALBUMIN SERPL BCG-MCNC: 3.8 G/DL (ref 3.5–5.2)
ALP SERPL-CCNC: 93 U/L (ref 40–129)
ALT SERPL W P-5'-P-CCNC: 44 U/L (ref 10–50)
ANION GAP SERPL CALCULATED.3IONS-SCNC: 12 MMOL/L (ref 7–15)
AST SERPL W P-5'-P-CCNC: 284 U/L (ref 10–50)
ATRIAL RATE - MUSE: 57 BPM
ATRIAL RATE - MUSE: 57 BPM
ATRIAL RATE - MUSE: 63 BPM
ATRIAL RATE - MUSE: 65 BPM
BILIRUB SERPL-MCNC: 0.8 MG/DL
BUN SERPL-MCNC: 12.5 MG/DL (ref 6–20)
CALCIUM SERPL-MCNC: 9.3 MG/DL (ref 8.6–10)
CHLORIDE SERPL-SCNC: 101 MMOL/L (ref 98–107)
CHOLEST SERPL-MCNC: 160 MG/DL
CREAT SERPL-MCNC: 0.91 MG/DL (ref 0.67–1.17)
DEPRECATED HCO3 PLAS-SCNC: 23 MMOL/L (ref 22–29)
DIASTOLIC BLOOD PRESSURE - MUSE: NORMAL MMHG
ERYTHROCYTE [DISTWIDTH] IN BLOOD BY AUTOMATED COUNT: 12.6 % (ref 10–15)
GFR SERPL CREATININE-BSD FRML MDRD: >90 ML/MIN/1.73M2
GLUCOSE SERPL-MCNC: 113 MG/DL (ref 70–99)
HBA1C MFR BLD: 5.7 %
HCT VFR BLD AUTO: 39.8 % (ref 40–53)
HDLC SERPL-MCNC: 38 MG/DL
HGB BLD-MCNC: 13.6 G/DL (ref 13.3–17.7)
INTERPRETATION ECG - MUSE: NORMAL
LDLC SERPL CALC-MCNC: 97 MG/DL
LVEF ECHO: NORMAL
MAGNESIUM SERPL-MCNC: 2.2 MG/DL (ref 1.7–2.3)
MCH RBC QN AUTO: 30.2 PG (ref 26.5–33)
MCHC RBC AUTO-ENTMCNC: 34.2 G/DL (ref 31.5–36.5)
MCV RBC AUTO: 88 FL (ref 78–100)
NONHDLC SERPL-MCNC: 122 MG/DL
P AXIS - MUSE: 24 DEGREES
P AXIS - MUSE: 25 DEGREES
P AXIS - MUSE: 27 DEGREES
P AXIS - MUSE: 32 DEGREES
PLATELET # BLD AUTO: 228 10E3/UL (ref 150–450)
POTASSIUM SERPL-SCNC: 3.5 MMOL/L (ref 3.4–5.3)
PR INTERVAL - MUSE: 216 MS
PR INTERVAL - MUSE: 224 MS
PR INTERVAL - MUSE: 228 MS
PR INTERVAL - MUSE: 230 MS
PROT SERPL-MCNC: 6.4 G/DL (ref 6.4–8.3)
QRS DURATION - MUSE: 76 MS
QRS DURATION - MUSE: 78 MS
QRS DURATION - MUSE: 80 MS
QRS DURATION - MUSE: 88 MS
QT - MUSE: 380 MS
QT - MUSE: 418 MS
QT - MUSE: 420 MS
QT - MUSE: 422 MS
QTC - MUSE: 395 MS
QTC - MUSE: 406 MS
QTC - MUSE: 408 MS
QTC - MUSE: 431 MS
R AXIS - MUSE: -14 DEGREES
R AXIS - MUSE: -36 DEGREES
R AXIS - MUSE: -6 DEGREES
R AXIS - MUSE: 31 DEGREES
RBC # BLD AUTO: 4.5 10E6/UL (ref 4.4–5.9)
SODIUM SERPL-SCNC: 136 MMOL/L (ref 136–145)
SYSTOLIC BLOOD PRESSURE - MUSE: NORMAL MMHG
T AXIS - MUSE: -21 DEGREES
T AXIS - MUSE: 18 DEGREES
T AXIS - MUSE: 45 DEGREES
T AXIS - MUSE: 65 DEGREES
TRIGL SERPL-MCNC: 125 MG/DL
TROPONIN T SERPL HS-MCNC: 1739 NG/L
VENTRICULAR RATE- MUSE: 57 BPM
VENTRICULAR RATE- MUSE: 57 BPM
VENTRICULAR RATE- MUSE: 63 BPM
VENTRICULAR RATE- MUSE: 65 BPM
WBC # BLD AUTO: 13.6 10E3/UL (ref 4–11)

## 2022-11-07 PROCEDURE — 93010 ELECTROCARDIOGRAM REPORT: CPT | Mod: 76 | Performed by: INTERNAL MEDICINE

## 2022-11-07 PROCEDURE — 85027 COMPLETE CBC AUTOMATED: CPT | Performed by: HOSPITALIST

## 2022-11-07 PROCEDURE — 97110 THERAPEUTIC EXERCISES: CPT | Mod: GO

## 2022-11-07 PROCEDURE — 255N000002 HC RX 255 OP 636: Performed by: INTERNAL MEDICINE

## 2022-11-07 PROCEDURE — 97535 SELF CARE MNGMENT TRAINING: CPT | Mod: GO

## 2022-11-07 PROCEDURE — 200N000001 HC R&B ICU

## 2022-11-07 PROCEDURE — 999N000208 ECHOCARDIOGRAM COMPLETE

## 2022-11-07 PROCEDURE — 82040 ASSAY OF SERUM ALBUMIN: CPT | Performed by: HOSPITALIST

## 2022-11-07 PROCEDURE — 93306 TTE W/DOPPLER COMPLETE: CPT | Mod: 26 | Performed by: INTERNAL MEDICINE

## 2022-11-07 PROCEDURE — 83735 ASSAY OF MAGNESIUM: CPT | Performed by: HOSPITALIST

## 2022-11-07 PROCEDURE — 84484 ASSAY OF TROPONIN QUANT: CPT | Performed by: INTERNAL MEDICINE

## 2022-11-07 PROCEDURE — 250N000013 HC RX MED GY IP 250 OP 250 PS 637: Performed by: INTERNAL MEDICINE

## 2022-11-07 PROCEDURE — G0378 HOSPITAL OBSERVATION PER HR: HCPCS

## 2022-11-07 PROCEDURE — 36415 COLL VENOUS BLD VENIPUNCTURE: CPT | Performed by: HOSPITALIST

## 2022-11-07 PROCEDURE — 250N000013 HC RX MED GY IP 250 OP 250 PS 637: Performed by: HOSPITALIST

## 2022-11-07 PROCEDURE — 96375 TX/PRO/DX INJ NEW DRUG ADDON: CPT

## 2022-11-07 PROCEDURE — 93010 ELECTROCARDIOGRAM REPORT: CPT | Performed by: INTERNAL MEDICINE

## 2022-11-07 PROCEDURE — 93005 ELECTROCARDIOGRAM TRACING: CPT

## 2022-11-07 PROCEDURE — 250N000011 HC RX IP 250 OP 636: Performed by: HOSPITALIST

## 2022-11-07 PROCEDURE — 99221 1ST HOSP IP/OBS SF/LOW 40: CPT | Performed by: INTERNAL MEDICINE

## 2022-11-07 PROCEDURE — 97165 OT EVAL LOW COMPLEX 30 MIN: CPT | Mod: GO

## 2022-11-07 PROCEDURE — 93005 ELECTROCARDIOGRAM TRACING: CPT | Performed by: INTERNAL MEDICINE

## 2022-11-07 PROCEDURE — 80061 LIPID PANEL: CPT | Performed by: INTERNAL MEDICINE

## 2022-11-07 PROCEDURE — 80053 COMPREHEN METABOLIC PANEL: CPT | Performed by: HOSPITALIST

## 2022-11-07 PROCEDURE — 83036 HEMOGLOBIN GLYCOSYLATED A1C: CPT | Performed by: HOSPITALIST

## 2022-11-07 PROCEDURE — 99233 SBSQ HOSP IP/OBS HIGH 50: CPT | Performed by: HOSPITALIST

## 2022-11-07 RX ORDER — ATORVASTATIN CALCIUM 10 MG/1
20 TABLET, FILM COATED ORAL EVERY EVENING
Status: DISCONTINUED | OUTPATIENT
Start: 2022-11-07 | End: 2022-11-07

## 2022-11-07 RX ORDER — POTASSIUM CHLORIDE 1500 MG/1
20 TABLET, EXTENDED RELEASE ORAL ONCE
Status: COMPLETED | OUTPATIENT
Start: 2022-11-07 | End: 2022-11-07

## 2022-11-07 RX ADMIN — POTASSIUM CHLORIDE 20 MEQ: 20 TABLET, EXTENDED RELEASE ORAL at 11:23

## 2022-11-07 RX ADMIN — PERFLUTREN 2 ML: 6.52 INJECTION, SUSPENSION INTRAVENOUS at 10:15

## 2022-11-07 RX ADMIN — ATORVASTATIN CALCIUM 80 MG: 40 TABLET, FILM COATED ORAL at 08:05

## 2022-11-07 RX ADMIN — TICAGRELOR 90 MG: 90 TABLET ORAL at 20:28

## 2022-11-07 RX ADMIN — POTASSIUM CHLORIDE 20 MEQ: 20 TABLET, EXTENDED RELEASE ORAL at 00:03

## 2022-11-07 RX ADMIN — Medication 81 MG: at 08:05

## 2022-11-07 RX ADMIN — TICAGRELOR 90 MG: 90 TABLET ORAL at 08:05

## 2022-11-07 RX ADMIN — MAGNESIUM SULFATE HEPTAHYDRATE 2 G: 40 INJECTION, SOLUTION INTRAVENOUS at 00:03

## 2022-11-07 ASSESSMENT — ACTIVITIES OF DAILY LIVING (ADL)
WALKING_OR_CLIMBING_STAIRS_DIFFICULTY: NO
CONCENTRATING,_REMEMBERING_OR_MAKING_DECISIONS_DIFFICULTY: NO
ADLS_ACUITY_SCORE: 35
VISION_MANAGEMENT: GLASSES
TOILETING_ISSUES: NO
ADLS_ACUITY_SCORE: 35
CHANGE_IN_FUNCTIONAL_STATUS_SINCE_ONSET_OF_CURRENT_ILLNESS/INJURY: NO
ADLS_ACUITY_SCORE: 35
ADLS_ACUITY_SCORE: 35
ADLS_ACUITY_SCORE: 20
PREVIOUS_RESPONSIBILITIES: MEAL PREP;HOUSEKEEPING;MEDICATION MANAGEMENT;FINANCES;DRIVING;WORK
ADLS_ACUITY_SCORE: 35
DIFFICULTY_EATING/SWALLOWING: NO
WEAR_GLASSES_OR_BLIND: YES
DRESSING/BATHING_DIFFICULTY: NO
ADLS_ACUITY_SCORE: 35
ADLS_ACUITY_SCORE: 35
ADLS_ACUITY_SCORE: 20
DOING_ERRANDS_INDEPENDENTLY_DIFFICULTY: NO
ADLS_ACUITY_SCORE: 35
ADLS_ACUITY_SCORE: 20
ADLS_ACUITY_SCORE: 35
FALL_HISTORY_WITHIN_LAST_SIX_MONTHS: NO
NUMBER_OF_TIMES_PATIENT_HAS_FALLEN_WITHIN_LAST_SIX_MONTHS: 0

## 2022-11-07 NOTE — PLAN OF CARE
[facility] - ICU    RN Progress Note:            Pertinent Assessments:      Please refer to flowsheet rows for full assessment     Denies chest pain overnight. Titrated Nitroglycerin gtt. Currently at 10 mcg/kg/min. Right radial angio site is within normal.         Mobility Level:     2         Key Events - This Shift:     Had 6 seconds Vtach at 05:10. Patient awake with normal Blood Pressure. Denies discomfort. Labs drawn early. Still waiting for result. Updated  on call Cardiolgist  through text pged  about episode of Vtach and plan for Integrilin.    SAT Safety Screen    If FAILED why?    SAT Performed    If FAILED why?    SBT Safety Screen    If FAILED why?                     Plan:     Echocardiogram today. Stop integrelin after 18 hours post intervention.         Point of Contact Update

## 2022-11-07 NOTE — PROGRESS NOTES
Nutrition Note    Received nutrition consult.  RD unable to consult on observation patients or outpatients in beds d/t licensure issues and CMS conditions of participation.  RD will monitor for any status changes and will complete consult if patient changes to inpatient status.

## 2022-11-07 NOTE — CONSULTS
Cardiology Progress Note  New to me.  Chart reviewed.  Assessment/Plan:    1. 57-year-old gentleman on no medications who presented with acute inferior lateral ST elevation myocardial infarction during exertion 11/6, status post intervention including distal RCA embolization into the posterolateral branches.  We will check echocardiogram today, start phase 1 cardiac rehab.  2. Mixed hyperlipidemia.  Starting high-dose atorvastatin.  Discussed rationale with patient  3. Moderate LAD disease.  Medical management for the time being.  Follow-up planned with Dr Rodriguez  4. History of hypertension, maintained on lisinopril prior to hospitalization    Principal Problem:    ST elevation myocardial infarction (STEMI), unspecified artery (H)  Active Problems:    ST elevation MI (STEMI) (H)     LOS: 0 days     Subjective:  57-year-old male with history of hypertension, family history of early atherosclerotic disease.  Cross-country ski team , doing vigorous exercise routinely when developed first ever onset of right upper chest, jaw discomfort rollerskiing.  Stopped and presented early.  Had had more vigorous training run the day prior.  Right chest pain gradually resolved overnight, now has some mild inspiratory discomfort only.  Denies shortness of breath.  Rested comfortably supine.        Objective:   Vital signs in last 24 hours:  Vitals:    11/07/22 0545 11/07/22 0600 11/07/22 0700 11/07/22 0800   BP: 112/72 114/73 104/89 107/59   Pulse: 56 55 71 72   Resp: 16 21 27 21   Temp:    98.6  F (37  C)   TempSrc:    Oral   SpO2: 97% 98% 99% 96%   Weight:       Height:         Weight:   Wt Readings from Last 3 Encounters:   11/07/22 82.3 kg (181 lb 7 oz)           PHYSICAL EXAM      Respiratory:  Normal breath sounds, No respiratory distress, No wheezing, No chest tenderness.   Cardiovascular:   Normal heart rate, Normal rhythm, No murmurs, No rubs, No gallops.   GI:  Bowel sounds normal, Soft, No tenderness, No  masses  Extremities: no edema.  Cath site without erythema or swelling.       Cardiographics:   Telemetry sinus rhythm, 60 to 70 bpm with PACs, PVCs    ECG (personally reviewed) today: Sinus rhythm, inferolateral myocardial infarction, ST elevation improved versus admission study but not resolved  Imaging:   Echocardiogram pending today    Coronary angiogram with intervention 11/6/2022:    Patent left main.    There is diffuse mild LAD disease with a focal moderate-severe mid LAD lesion. The first diagonal is a large branching system with mild-moderate disease.    The circumflex is grossly normal.    The RCA is a large ectatic artery with a distal RCA ulcerated and thrombotic stenosis with evidence of distal emboli to the two lateral PL branches. We started with thrombectomy, followed by IVUS for stent sizing and then stenting with overlapping Megatron 5.0 mm SUJEY dilated to 5.5 mm. There was further distal embolization to the PDA during the stenting. Intracoronary adenosine 300mcg and ntg 200mg were given through a twin pass catheter in the distal RCA with some improvement in the distal PDA flow, but not the PL branches. Integrillin bolus x 2 given and gtt started, as was a low dose NTG gtt to augment microvascular flow and clot break down. Chest pain remained 3/10 and did not change during the procedure. Challenging PCI due to vessel ectasia and heavy thrombus burden.    LV EDP 11 mmHg.      Lab Results:   Lab Results   Component Value Date    WBC 13.6 (H) 11/07/2022    HGB 13.6 11/07/2022    HCT 39.8 (L) 11/07/2022     11/07/2022    CHOL 178 04/11/2022    TRIG 292 (H) 04/11/2022    HDL 37 (L) 04/11/2022    ALT 44 11/07/2022     (H) 11/07/2022     11/07/2022    BUN 12.5 11/07/2022    CO2 23 11/07/2022    TSH 0.60 06/21/2018    PSA 0.55 04/11/2022    INR 1.15 11/06/2022     No results found for: CKTOTAL, CKMB, TROPONINI      Onel Katz MD [facility]  11/7/2022

## 2022-11-07 NOTE — PLAN OF CARE
Cardiac Rehab Discharge Summary    Reason for therapy discharge:    All goals and outcomes met, no further needs identified.    Progress towards therapy goal(s). See goals on Care Plan in Epic electronic health record for goal details.  Goals met    Therapy recommendation(s):    No further therapy is recommended. OT recommending Outpatient cardiac rehab upon discharge     Goal Outcome Evaluation:

## 2022-11-07 NOTE — PROGRESS NOTES
Occupational Therapy      11/07/22 0930   Appointment Info   Signing Clinician's Name / Credentials (OT) Misa Nelson OTR/L   Living Environment   People in Home child(pooja), dependent;significant other   Current Living Arrangements house   Home Accessibility stairs to enter home;stairs within home   Number of Stairs, Main Entrance 5   Stair Railings, Main Entrance railings safe and in good condition   Number of Stairs, Within Home, Primary six  (split entry home)   Stair Railings, Within Home, Primary railings safe and in good condition   Transportation Anticipated family or friend will provide   Living Environment Comments Tub/shower no GB or shower chair   Self-Care   Usual Activity Tolerance excellent   Current Activity Tolerance good   Regular Exercise No   Equipment Currently Used at Home none   Fall history within last six months no   Activity/Exercise/Self-Care Comment Ind. w/ ADL routine   Instrumental Activities of Daily Living (IADL)   Previous Responsibilities meal prep;housekeeping;medication management;finances;driving;work   General Information   Onset of Illness/Injury or Date of Surgery 11/06/22   Referring Physician Mira Rodriguez MD   Additional Occupational Profile Info/Pertinent History of Current Problem 57 year old male admitted on 11/6/2022. He has history of hypertension and came in today with chief complaints of chest pain.  Work-up revealed acute inferior and lateral STEMI.  Coronary angiogram showed diffuse mid LAD with a focal moderate to severe mid LAD.   Existing Precautions/Restrictions cardiac   Cognitive Status Examination   Orientation Status orientation to person, place and time   Range of Motion Comprehensive   General Range of Motion no range of motion deficits identified   Bed Mobility   Bed Mobility No deficits identified   Transfers   Transfers No deficits identified   Balance   Balance Assessment no deficits were identified   Clinical Impression   Criteria for Skilled  Therapeutic Interventions Met (OT) Evaluation only   OT Diagnosis decreased act. tolerance   OT Problem List-Impairments impacting ADL problems related to;activity tolerance impaired;mobility   Assessment of Occupational Performance 1-3 Performance Deficits   Planned Therapy Interventions (OT) ADL retraining;strengthening   Clinical Decision Making Complexity (OT) low complexity   Risk & Benefits of therapy have been explained evaluation/treatment results reviewed;patient   OT Total Evaluation Time   OT Eval, Low Complexity Minutes (90203) 8   OT Goals   Therapy Frequency (OT) One time eval and treatment   OT Predicted Duration/Target Date for Goal Attainment 11/07/22   OT Goals Cardiac Phase 1   OT: Understanding of cardiac education to maximize quality of life, condition management, and health outcomes Patient;Verbalize;Demonstrate   OT: Perform aerobic activity with stable cardiovascular response continuous;10 minutes   OT: Functional/aerobic ambulation tolerance with stable cardiovascular response in order to return to home and community environment Modified independent;200 feet   OT: Navigation of stairs simulating home set up with stable cardiovascular response in order to return to home and community environment Modified independent;6 stairs   Interventions   Interventions Quick Adds Self-Care/Home Management;Cardiac Rehab;Therapeutic Procedures/Exercise   Self-Care/Home Management   Self-Care/Home Mgmt/ADL, Compensatory, Meal Prep Minutes (64018) 8   Symptoms Noted During/After Treatment (Meal Preparation/Planning Training) none   Treatment Detail/Skilled Intervention See cardiac tab for details; Ind. w/ bed mobility w/ education for R. wrist precautions following STEMI/stent placement. Ind. toileting/STS trnfs w/o device   Therapeutic Procedures/Exercise   Therapeutic Procedure: strength, endurance, ROM, flexibillity minutes (49718) 15   Symptoms Noted During/After Treatment none   Treatment Detail/Skilled  Intervention see ambulation/stair tab   Treatment Time Includes (CR Only) Monitoring of vital signs (see vital signs flowsheet for details)   Ambulation   Workload [id number]   Symptoms Denies symptoms   Cardiovascular Response Normal   Exercise Details Ambulated w/o device no LOB Mod.I   Vital Signs Details WFL   Stairs   Workload 10   Symptoms Denies symptoms   Cardiovascular Response Normal   Exercise Details Ind. w/ stairs   Vital Signs Details WFL   Cardiac Education   Education Provided Emergency plan;Energy conservation;Home exercise program;Outpatient Cardiac Rehab;Precautions;Resuming home activities;Risk factors;Stop light tool;Signs and symptoms   Education Packet Given to Patient Yes   All Patient Education Handouts Reviewed with Patient and/or Family Yes   Cardiac Rehab Phase II Plan   Phase II Order Received No   Phase II Appointment Status Not scheduled   Not Scheduled Reason Other (see comments)  (Referral was placed not scheduled would prefer [facility])   OT Discharge Planning   OT Plan d/c Cardiac OT inpatient   OT Discharge Recommendation (DC Rec) home with outpatient cardiac rehab   OT Rationale for DC Rec Pt moving well w/ Mod.I w/o device vitals stable w/ functional act. Family at home to assist pt as needed w/ IADL tasks   Total Session Time   Timed Code Treatment Minutes 23   Total Session Time (sum of timed and untimed services) 31

## 2022-11-07 NOTE — PROGRESS NOTES
.  Hospitalist Progress Note    Assessment/Plan    Principal Problem:    ST elevation myocardial infarction (STEMI), unspecified artery (H)  Active Problems:    ST elevation MI (STEMI) (H)    Kvng Maharaj is a 57 year old male admitted on 11/6/2022. He has history of hypertension and came in  with chief complaints of chest pain.  Work-up revealed acute inferior and lateral STEMI, status post intervention including distal RCA embolization into the posterolateral branches.     STEMI to inferolateral leads  -Patient presented with acute chest pain  EKG showed ST elevation in lateral leads and ST depression in aVL and V1-2  -Emergently underwent coronary angiogram which showed moderate to severe mid LAD disease.  The RCA had a thrombotic lesion with evidence of embolization.   -Patient continued to have chest discomfort and was admitted to ICU on nitro and and Integrilin drip, which is currently off.  -Continue aspirin, statin, Brilinta  -Echocardiogram revealed ejection fraction of 45 to 50%.  Distal septum and apex appears hypokinetic to akinetic.  The basal inferior wall appears mildly hypokinetic.  -Cardiac rehab consulted     Hypertension - BP currently stable.  -Hold PTA lisinopril-hydrochlorothiazide as currently BP stable.  Will resume if blood pressure noted to be trending up.     Hyperglycemia  -A1c 5.7     HypoK - repleted     Leukocytosis-unclear, no evidence of any infection  -Hold off on antibiotics  -Chest x-ray clear. Trend WBC.    Barriers to Discharge:  Monitoring    Anticipated discharge date/Disposition: Tomorrow    Subjective  Chart reviewed and events noted.  Patient seen and examined.   Very minimal chest discomfort. No pain. No SOB, dizziness, N/V, diaphoresis.       Objective    Vital signs in last 24 hours  Temp:  [97.8  F (36.6  C)-98.6  F (37  C)] 98.2  F (36.8  C)  Pulse:  [52-82] 66  Resp:  [10-31] 24  BP: ()/(57-94) 127/84  SpO2:  [94 %-100 %] 98 % [unfilled] O2 Device: Nasal  cannula    Weight:   [unfilled] Weight change:     Intake/Output last 3 shifts  I/O last 3 completed shifts:  In: 955.4 [P.O.:720; I.V.:235.4]  Out: 1470 [Urine:1470]  Body mass index is 30.19 kg/m .    Physical Exam    General Appearance:    Alert, cooperative, no distress, appears stated age   Lungs:     CTAB   Cardiovascular:    RRR   Abdomen:     Soft, non-tender, bowel sounds active all four quadrants,     no masses, no organomegaly   Neurologic:   Grossly normal     Pertinent Labs   Lab Results: personally reviewed.   Recent Labs   Lab 11/07/22 0521 11/06/22 1823 11/06/22  1300    136 137   CO2 23 24 24   BUN 12.5 16.1 16.6   ALBUMIN 3.8  --   --    ALKPHOS 93  --   --    ALT 44  --   --    *  --   --      Recent Labs   Lab 11/07/22 0521 11/06/22 1823 11/06/22  1300   WBC 13.6* 11.9* 18.3*   HGB 13.6 14.0 15.6   HCT 39.8* 41.7 45.3    239 252     No results for input(s): CKTOTAL, TROPONINI in the last 168 hours.    Invalid input(s): TROPONINT, CKMBINDEX  Invalid input(s): POCGLUFGR    Medications  Drug and lactation database from the [place] National Library of Medicine:  http://toxnet.nlm.nih.gov/cgi-bin/sis/htmlgen?LACT      Pertinent Radiology   Radiology Results:  Personally reviewed impressions    Reviewed labs in last 24 hours.    Advanced Care Planning:  Discharge Planning discussed with patient  Total time with this patient is 35 min with greater than 50% of time spent in coordination of care.  Care discussed and coordinated with patient, care team.    This Note is created using dragon voice recognition software.  Errors in spelling or words which seems out of context are unintentional.  Sounds alike errors may have escaped editing.    Iram Correia MD  Hospitalist

## 2022-11-07 NOTE — SIGNIFICANT EVENT
Patient resting comfortably with minimal discomfort. EKG rhythm began showing some ventricular complexes, or switching between bundles. BP dropped. Nitroglycerin drip was decreased. 12 lead EKG showed continued 1st degree AVB. Telemetry monitoring does show P waves that march out evenly and then disappear into the QRS and then reappear behind the QRS. Charge RN notified. On call cardiologist paged. NNO. Continue to monitor and notify if he starts to decompensate.

## 2022-11-08 VITALS
RESPIRATION RATE: 16 BRPM | SYSTOLIC BLOOD PRESSURE: 102 MMHG | HEART RATE: 70 BPM | TEMPERATURE: 97.7 F | WEIGHT: 174.38 LBS | HEIGHT: 65 IN | BODY MASS INDEX: 29.05 KG/M2 | OXYGEN SATURATION: 99 % | DIASTOLIC BLOOD PRESSURE: 68 MMHG

## 2022-11-08 LAB
HCT VFR BLD AUTO: 40.3 % (ref 40–53)
HGB BLD-MCNC: 13.9 G/DL (ref 13.3–17.7)
MAGNESIUM SERPL-MCNC: 1.9 MG/DL (ref 1.7–2.3)
PLATELET # BLD AUTO: 241 10E3/UL (ref 150–450)
POTASSIUM SERPL-SCNC: 3.9 MMOL/L (ref 3.4–5.3)
SARS-COV-2 RNA RESP QL NAA+PROBE: NEGATIVE

## 2022-11-08 PROCEDURE — G0008 ADMIN INFLUENZA VIRUS VAC: HCPCS | Performed by: INTERNAL MEDICINE

## 2022-11-08 PROCEDURE — 250N000013 HC RX MED GY IP 250 OP 250 PS 637: Performed by: INTERNAL MEDICINE

## 2022-11-08 PROCEDURE — 85049 AUTOMATED PLATELET COUNT: CPT | Performed by: EMERGENCY MEDICINE

## 2022-11-08 PROCEDURE — 85014 HEMATOCRIT: CPT | Performed by: EMERGENCY MEDICINE

## 2022-11-08 PROCEDURE — U0003 INFECTIOUS AGENT DETECTION BY NUCLEIC ACID (DNA OR RNA); SEVERE ACUTE RESPIRATORY SYNDROME CORONAVIRUS 2 (SARS-COV-2) (CORONAVIRUS DISEASE [COVID-19]), AMPLIFIED PROBE TECHNIQUE, MAKING USE OF HIGH THROUGHPUT TECHNOLOGIES AS DESCRIBED BY CMS-2020-01-R: HCPCS | Performed by: EMERGENCY MEDICINE

## 2022-11-08 PROCEDURE — 90682 RIV4 VACC RECOMBINANT DNA IM: CPT | Performed by: INTERNAL MEDICINE

## 2022-11-08 PROCEDURE — 36415 COLL VENOUS BLD VENIPUNCTURE: CPT | Performed by: EMERGENCY MEDICINE

## 2022-11-08 PROCEDURE — 84132 ASSAY OF SERUM POTASSIUM: CPT | Performed by: HOSPITALIST

## 2022-11-08 PROCEDURE — 250N000011 HC RX IP 250 OP 636: Performed by: INTERNAL MEDICINE

## 2022-11-08 PROCEDURE — 99239 HOSP IP/OBS DSCHRG MGMT >30: CPT | Performed by: HOSPITALIST

## 2022-11-08 PROCEDURE — 83735 ASSAY OF MAGNESIUM: CPT | Performed by: HOSPITALIST

## 2022-11-08 RX ORDER — MAGNESIUM SULFATE HEPTAHYDRATE 40 MG/ML
2 INJECTION, SOLUTION INTRAVENOUS ONCE
Status: COMPLETED | OUTPATIENT
Start: 2022-11-08 | End: 2022-11-08

## 2022-11-08 RX ADMIN — Medication 81 MG: at 08:00

## 2022-11-08 RX ADMIN — TICAGRELOR 90 MG: 90 TABLET ORAL at 08:00

## 2022-11-08 RX ADMIN — ATORVASTATIN CALCIUM 80 MG: 40 TABLET, FILM COATED ORAL at 08:00

## 2022-11-08 RX ADMIN — INFLUENZA A VIRUS A/WISCONSIN/588/2019 (H1N1) RECOMBINANT HEMAGGLUTININ ANTIGEN, INFLUENZA A VIRUS A/DARWIN/6/2021 (H3N2) RECOMBINANT HEMAGGLUTININ ANTIGEN, INFLUENZA B VIRUS B/AUSTRIA/1359417/2021 RECOMBINANT HEMAGGLUTININ ANTIGEN, AND INFLUENZA B VIRUS B/PHUKET/3073/2013 RECOMBINANT HEMAGGLUTININ ANTIGEN 0.5 ML: 45; 45; 45; 45 INJECTION INTRAMUSCULAR at 08:50

## 2022-11-08 RX ADMIN — MAGNESIUM SULFATE HEPTAHYDRATE 2 G: 40 INJECTION, SOLUTION INTRAVENOUS at 05:39

## 2022-11-08 ASSESSMENT — ACTIVITIES OF DAILY LIVING (ADL)
ADLS_ACUITY_SCORE: 20

## 2022-11-08 NOTE — PLAN OF CARE
Goal Outcome Evaluation:      Plan of Care Reviewed With: patient    Overall Patient Progress: improvingOverall Patient Progress: improving    Outcome Evaluation: OK to discharge to home    [facility] - ICU    RN Progress Note:            Pertinent Assessments:      Please refer to flowsheet rows for full assessment     Pt denies chest pain         Mobility Level:     1         Key Events - This Shift:      AVS reviewed with pt and his wife. They verbalized understanding.               Plan:     Pt to discharge to home.

## 2022-11-08 NOTE — PROGRESS NOTES
Order changed by Wesley Seymour and Bren to follow up with Cardiologist in 1 week, not 6 as indicated on AVS.     Called Cardiology appointment line at [phone] and made appointment for Luis on Monday 11/14 at 1:55pm with Laurel Plasencia at [facility], [address].     Called pt at home and gave him above information.

## 2022-11-08 NOTE — DISCHARGE INSTRUCTIONS

## 2022-11-08 NOTE — DISCHARGE SUMMARY
[facility] MEDICINE  DISCHARGE SUMMARY     Primary Care Physician: Scooby Desouza  Admission Date: 11/6/2022   Discharge Provider: Iram Correia MD Discharge Date: 11/8/2022   Diet:   Active Diet and Nourishment Order   Procedures     Diet       Code Status: Prior   Activity: DCACTIVITY: Activity as tolerated        Condition at Discharge: Stable     REASON FOR PRESENTATION(See Admission Note for Details)     Chest pain, STEMI    PRINCIPAL & ACTIVE DISCHARGE DIAGNOSES     Principal Problem:    ST elevation myocardial infarction (STEMI), unspecified artery (H)  Active Problems:    ST elevation MI (STEMI) (H)      PENDING LABS     Unresulted Labs Ordered in the Past 30 Days of this Admission     No orders found from 10/7/2022 to 11/7/2022.            PROCEDURES ( this hospitalization only)      Procedure(s):  Coronary Angiogram  Percutaneous Coronary Intervention Stent  Percutaneous Coronary Intervention Aspiration Thrombectomy  Intravascular Ultrasound  Left Heart Catheterization    RECOMMENDATIONS TO OUTPATIENT PROVIDER FOR F/U VISIT     Follow-up Appointments     Follow-up and recommended labs and tests       Follow up with primary care provider, Scooby Desouza, within 7 days   for hospital follow- up.  The following labs/tests are recommended: CBC,   BMP. .    Follow up with Cardiology as recommended.               DISPOSITION     Home    SUMMARY OF HOSPITAL COURSE:      Kvng Maharaj is a 57 year old male admitted on 11/6/2022. He has history of hypertension and came in  with chief complaints of chest pain.  Work-up revealed acute inferior and lateral STEMI, status post intervention including distal RCA embolization into the posterolateral branches.     STEMI to inferolateral leads  -Patient presented with acute chest pain  EKG showed ST elevation in lateral leads and ST depression in aVL and V1-2  -Emergently underwent coronary angiogram which showed moderate  "to severe mid LAD disease.  The RCA had a thrombotic lesion with evidence of embolization.   -Patient continued to have chest discomfort and was admitted to ICU on nitro and and Integrilin drip, which is currently off.  -Continue aspirin, statin, Brilinta  -Echocardiogram revealed ejection fraction of 45 to 50%.  Distal septum and apex appears hypokinetic to akinetic.  The basal inferior wall appears mildly hypokinetic.  -Cardiac rehab on discharge  -Patient was discharged prior to being seen by cardiology on day of discharge.Follow up out-pt in 1 week.      Hypertension - BP currently stable.  -Stopped PTA lisinopril-hydrochlorothiazide as currently BP stable.    -To b resumed as out-patient if blood pressure noted to be trending up.     Hyperglycemia  -A1c 5.7     HypoK - repleted     Leukocytosis-unclear, no evidence of any infection  -Hold off on antibiotics  -Chest x-ray clear.     .Patient stable to be discharged home today. Please refer to discharge medications and instructions for more details.      Discharge Medications with Med changes:     Discharge Medication List as of 11/8/2022  9:03 AM      START taking these medications    Details   aspirin (ASA) 81 MG chewable tablet Take 1 tablet (81 mg) by mouth daily Starting tomorrow., Disp-30 tablet, R-3, E-Prescribe      atorvastatin (LIPITOR) 80 MG tablet Take 1 tablet (80 mg) by mouth daily, Disp-90 tablet, R-3, E-Prescribe      nitroGLYcerin (NITROSTAT) 0.4 MG sublingual tablet One tablet under the tongue every 5 minutes if needed for chest pain. May repeat every 5 minutes for a maximum of 3 doses in 15 minutes\", Disp-25 tablet, R-3, E-Prescribe      ticagrelor (BRILINTA) 90 MG tablet Take 1 tablet (90 mg) by mouth 2 times daily Dose to start tomorrow morning., Disp-180 tablet, R-3, E-Prescribe         CONTINUE these medications which have NOT CHANGED    Details   allopurinol (ZYLOPRIM) 300 MG tablet Take 1 tablet by mouth daily, Historical         STOP " taking these medications       lisinopril-hydrochlorothiazide (ZESTORETIC) 20-12.5 MG tablet Comments:   Reason for Stopping:                     Rationale for medication changes:      See med rec        Consults     CARDIOLOGY IP CONSULT  NUTRITION SERVICES ADULT IP CONSULT  CARDIAC REHAB IP CONSULT  PHARMACY IP CONSULT  HOSPITALIST IP CONSULT  CARDIOLOGY IP CONSULT    Immunizations given this encounter     Most Recent Immunizations   Administered Date(s) Administered     Influenza Quad, Recombinant, pf(RIV4) (Flublok) 11/08/2022           Anticoagulation Information      Recent INR results:   Recent Labs   Lab 11/06/22  1823   INR 1.15     Warfarin doses (if applicable) or name of other anticoagulant: N/A      SIGNIFICANT IMAGING FINDINGS     Results for orders placed or performed during the hospital encounter of 11/06/22   XR Chest Port 1 View    Impression    IMPRESSION: Negative chest.   Echocardiogram Complete   Result Value Ref Range    LVEF  45-50%        SIGNIFICANT LABORATORY FINDINGS       Discharge Orders        Ambulatory CARDIAC REHAB REFERRAL      Ambulatory Cardiologist Referral      Follow-Up with Cardiology Ambulatory [facility] Referral      Reason for your hospital stay    STEMI     Follow-up and recommended labs and tests     Follow up with primary care provider, Scooby Desouza, within 7 days for hospital follow- up.  The following labs/tests are recommended: CBC, BMP. .    Follow up with Cardiology as recommended.     Activity    Your activity upon discharge: activity as tolerated     Diet    Follow this diet upon discharge: Orders Placed This Encounter      Low Saturated Fat Na <2400 mg       Examination   Physical Exam   Temp:  [97.7  F (36.5  C)-98.8  F (37.1  C)] 97.7  F (36.5  C)  Pulse:  [66-84] 70  Resp:  [16-18] 16  BP: (102-127)/(60-84) 102/68  Cuff Mean (mmHg):  [78] 78  SpO2:  [95 %-99 %] 99 %  Wt Readings from Last 1 Encounters:   11/08/22 79.1 kg (174 lb 6.1 oz)        General: Pleasant, NAD  HEENT:EOMI, AT,NC  CVS:RRR, no edema  RS:CTAB  Abd: Soft, NT,ND  Neurology:Grossly normal  Psy:Approrpiate affect        Please see EMR for more detailed significant labs, imaging, consultant notes etc.    IIram MD, personally saw the patient today and spent greater than 30 minutes discharging this patient.    Iram Correia MD  [facility]    CC:Scooby Desouza

## 2022-11-08 NOTE — PLAN OF CARE
Problem: Plan of Care - These are the overarching goals to be used throughout the patient stay.    Goal: Plan of Care Review  Description: The Plan of Care Review/Shift note should be completed every shift.  The Outcome Evaluation is a brief statement about your assessment that the patient is improving, declining, or no change.  This information will be displayed automatically on your shift note.  11/7/2022 1827 by Cathy Wiggins RN  Outcome: Progressing  11/7/2022 1827 by Cathy Wiggins RN  Outcome: Progressing     Problem: Plan of Care - These are the overarching goals to be used throughout the patient stay.    Goal: Readiness for Transition of Care  11/7/2022 1827 by Cathy Wiggins RN  Outcome: Progressing  11/7/2022 1827 by Cathy Wiggins RN  Outcome: Progressing     Problem: Hypertension Comorbidity  Goal: Blood Pressure in Desired Range  11/7/2022 1827 by Cathy Wiggins RN  Outcome: Progressing  11/7/2022 1827 by Cathy Wiggins RN  Outcome: Progressing   Goal Outcome Evaluation:     VSS. Pt developed 1/10 chest pain, cards notified, EKG complete, pain subside on its on. Ambulating to bathroom independently. No other significant events

## 2022-11-08 NOTE — PROGRESS NOTES
Care Management Discharge Note    Discharge Date: 11/08/2022       Discharge Disposition: Home    Discharge Services:  (self care)    Discharge DME:  none    Discharge Transportation: family or friend will provide    Private pay costs discussed: Not applicable    Education Provided on the Discharge Plan:  Per Care Team  Persons Notified of Discharge Plans: patient  Patient/Family in Agreement with the Plan: yes    Handoff Referral Completed: Yes    Additional Information:  Patient discharging home today.  He is independent at baseline.  No care management needs identified during this hospitalization.  Spouse here and will provide transportation home.    Beth Ash RN

## 2022-11-08 NOTE — PLAN OF CARE
[facility] - ICU    RN Progress Note:            Pertinent Assessments:      Please refer to flowsheet rows for full assessment     Alert and oriented, denied pain and discomfort. Up and independent inhis room.         Mobility Level:     5         Key Events - This Shift:          NA              Plan:     Able to go home today at some point.         Point of Contact Update YES-OR-NO: N/A  If No, reason: na  Name:na  Phone Number:na  Summary of Conversation: na       Goal Outcome Evaluation:      Plan of Care Reviewed With: patient    Overall Patient Progress: improvingOverall Patient Progress: improving

## 2022-11-09 ENCOUNTER — TELEPHONE (OUTPATIENT)
Dept: CARDIOLOGY | Facility: CLINIC | Age: 57
End: 2022-11-09

## 2022-11-09 NOTE — TELEPHONE ENCOUNTER
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 [employer] . Works mostly remotely on computers. Mostly sedentary work/light duty. -MANISHA

## 2022-11-09 NOTE — TELEPHONE ENCOUNTER
[facility]    Phone Message    May a detailed message be left on voicemail: no     Reason for Call: Other: Luis called to request documentation for approval to return to work. He is employed at [employer] Phone# [phone]. Please ATTN: Mira      Action Taken: Other: [facility]    Travel Screening: Not Applicable

## 2022-11-09 NOTE — TELEPHONE ENCOUNTER
From: Laurel Pérez APRN CNP  Sent: 11/9/2022  11:14 AM CST  To: Hanh Lugo    Please check with Dr. Rodriguez, since I have not seen the patient yet.  Thanks Hanh!  CY

## 2022-11-09 NOTE — TELEPHONE ENCOUNTER
From: Mira Rodriguez MD  Sent: 11/9/2022   4:08 PM CST  To: Hanh Lugo    Please provide note saying okay to go back to work 1 week after discharge.    EG

## 2022-11-09 NOTE — TELEPHONE ENCOUNTER
"Spoke with pt regarding call back request. Pt has follow up on 11/14 with Laurel.    Per AVS from discharge \"You may return to work in 72 hours if no complications and no heavy lifting.\"      Pt indicated asymptomatic and no complications.     Per pt's hr this was not sufficient and needs a formal letter. Advised will discuss with Laurel and reach out with recommendations. -MANISHA  "

## 2022-11-11 PROBLEM — E78.5 DYSLIPIDEMIA, GOAL LDL BELOW 70: Status: ACTIVE | Noted: 2022-11-11

## 2022-11-11 PROBLEM — I25.5 ISCHEMIC CARDIOMYOPATHY: Status: ACTIVE | Noted: 2022-11-11

## 2022-11-11 PROBLEM — I10 HTN (HYPERTENSION): Status: ACTIVE | Noted: 2022-11-11

## 2022-11-11 NOTE — PROGRESS NOTES
Assessment/Recommendations   Assessment:    1.  Coronary artery disease with status post STEMI and status post thrombectomy with PCI/SUJEY to distal RCA and known moderate to severe mid LAD lesion and mild to moderate disease in first diagonal.    On dual antiplatelet therapy with ASA 81 mg indefinitely and Ticagrelor (Brilinta) 90 mg twice a day for 12 months.  Patient denies any chest pain or angina.    Cardiac rehab has been ordered/scheduled    2.  Dyslipidemia with LDL goal <70/Obesity with a BMI of: Kvng Maharaj is on high intensity statin therapy with atorvastatin 80 Mg daily. Most recent LDL is 97.  Most recent AST is elevated at 284.  Patient is asymptomatic.  This could be due to recent acute STEMI.    3.  Hypertension: Blood pressure today stable at 111/73.  He was on lisinopril-hydrochlorothiazide with 20 Mg-12.5 mg daily.  This was discontinued in the hospital.    4.  Ischemic cardiomyopathy with mild systolic dysfunction with LVEF of 45 to 50% per echo on 11/6/2022: He is well compensated with no evidence of fluid tension assessment.  He is currently not on beta-blocker therapy.  His heart rate was in the 50s in the hospital recently.  Heart rate today stable in mid 60s.        Plan:  - We discussed the importance of antiplatelet therapy and talking with his cardiologist prior to stopping these medications for any reason.  We discussed about utilization of as needed nitroglycerin.     -Resume lisinopril at 2.5 mg daily for cardiomyopathy with mildly reduced ejection fraction.    -We will continue to monitor his heart rate in cardiac rehab and will consider adding low-dose of beta-blocker therapy if stable heart rate in the near future.    - Encouraged to seek medical attention if recurrent chest pain or shortness of breath.    - Risk factor modification and lifestyle management topics were discussed including managing comorbidities, weight loss, heart healthy diet, exercise and stress  reduction.      - Fasting lipid profile check and CMP in 4 to 6 weeks    - Cardiac rehab as scheduled/schedule cardiac rehab    - We discussed a diet low in saturated fat, weight loss, and exercise along with medication for better control of cholesterol.  Highly encouraged to participate in nutrition class in cardiac rehab.    Patient was recommended to consider staged PCI to LAD.  Will discuss with Dr. Rodriguez and have DAVID Hernández follow-up with patient.    Follow up with Dr. Rodriguez as scheduled on February 28.     History of Present Illness/Subjective    Mr. Kvng Maharaj is a 57 year old male with a past medical history of hypertension who came in with chief complaint of chest discomfort with some numbness in his teeth and tongue post exercise.  He was hospitalized from November 6 2 number eighth due to acute STEMI.  Patient underwent successful thrombectomy/drug-eluting stent placement to lesion in distal RCA.  He was recommended to consider staged PCI to LAD in the near future.    Patient is here today with his wife  for post coronary intervention follow up.     He denies fatigue, lightheadedness, shortness of breath, dyspnea on exertion, orthopnea, PND, palpitations, chest pain, abdominal fullness/bloating and lower extremity edema.  He denies any adverse effect to medications.  He denies any bleeding complications.  Patient and his wife had several questions about recent hospitalization, current angiogram and medications and upcoming appointments and future planning and treatment.  This was discussed in detail.    Coronary Angiogram done on 11/6/22: reviewed:  Conclusion    Acute inferior and lateral STEMI in a patient with a family history of premature coronary disease.    Patent left main.    There is diffuse mild LAD disease with a focal moderate-severe mid LAD lesion. The first diagonal is a large branching system with mild-moderate disease.    The circumflex is grossly normal.    The RCA is a large  ectatic artery with a distal RCA ulcerated and thrombotic stenosis with evidence of distal emboli to the two lateral PL branches. We started with thrombectomy, followed by IVUS for stent sizing and then stenting with overlapping Megatron 5.0 mm SUJEY dilated to 5.5 mm. There was further distal embolization to the PDA during the stenting. Intracoronary adenosine 300mcg and ntg 200mg were given through a twin pass catheter in the distal RCA with some improvement in the distal PDA flow, but not the PL branches. Integrillin bolus x 2 given and gtt started, as was a low dose NTG gtt to augment microvascular flow and clot break down. Chest pain remained 3/10 and did not change during the procedure. Challenging PCI due to vessel ectasia and heavy thrombus burden.    LV EDP 11 mmHg.         Recommendations    General Recommendations:  - Follow up visit with Nurse Practitioner in 1-2 weeks.   - Recommended follow up with doctor Dr. Rodriguez in 6 weeks.   - Arterial sheath removed from radial artery with TR Band placement.   - Recommend cardiac rehabilitation.     Medications:  - Continue dual antiplatelet therapy for 12 month(s).   - Continue high dose statin therapy indefinitely.   - Risk factor management for atherosclerosis.     Percutaneous Therapy:   - Consider PCI treatment of stenosis in the LAD.     Other:  - Continue integrillin gtt for 18 hours and NTG gtt as tolerated for distal embolization         ECHO done on 11/6/22-Reviewed:   Interpretation Summary     Normal left ventricular size. Left ventricular ejection fraction is mildly  reduced. Left ventricular ejection fraction estimated 45 to 50%. The distal  septum and apex appears hypokinetic to akinetic. The basilar inferior wall  appears mildly hypokinetic.  Diastolic Doppler findings (E/E' ratio and/or other parameters) suggest left  ventricular filling pressures are normal.  Normal right ventricular size and systolic function  No observed hemodynamically  "significant valve abnormality  The proximal ascending aorta measures mildly enlarged at 4.0 cm.       Physical Examination Review of Systems   /73 (BP Location: Right arm, Patient Position: Sitting, Cuff Size: Adult Regular)   Pulse 67   Resp 16   Ht 1.651 m (5' 5\")   Wt 78.5 kg (173 lb)   BMI 28.79 kg/m    Body mass index is 28.79 kg/m .  Wt Readings from Last 3 Encounters:   11/14/22 78.5 kg (173 lb)   11/08/22 79.1 kg (174 lb 6.1 oz)     General Appearance:   no distress, normal body habitus   ENT/Mouth: membranes moist, no oral lesions or bleeding gums.      EYES:  no scleral icterus, normal conjunctivae   Neck: no carotid bruits or thyromegaly   Chest/Lungs:   lungs are clear to auscultation, no rales or wheezing, equal chest wall expansion    Cardiovascular:   Heart rate regular. Normal first and second heart sounds with no murmurs, rubs, or gallops; the carotid, radial and posterior tibial pulses are intact, Jugular venous pressure flat with no edema bilaterally    Abdomen:  no organomegaly, masses, bruits, or tenderness; bowel sounds are present   Extremities  Puncture Site: no cyanosis or clubbing  Right radial site intact.  Radial pulses and Pedal pulses intact and symmetrical.  CMS intact.   Skin: no xanthelasma, warm.    Neurologic: normal  bilateral, no tremors     Psychiatric: alert and oriented x3, calm                                                      Negative unless noted in HPI     Medical History  Surgical History Family History Social History   No past medical history on file. Past Surgical History:   Procedure Laterality Date     CV CORONARY ANGIOGRAM N/A 11/6/2022    Procedure: Coronary Angiogram;  Surgeon: Mira Rodriguez MD;  Location: [facility] CV     CV INTRAVASULAR ULTRASOUND N/A 11/6/2022    Procedure: Intravascular Ultrasound;  Surgeon: Mira Rodriguez MD;  Location: [facility] CV     CV LEFT HEART CATH N/A 11/6/2022    Procedure: Left Heart " Catheterization;  Surgeon: Mira Rodriguez MD;  Location: [facility] CV     CV PCI ASPIRATION THROMECTOMY N/A 11/6/2022    Procedure: Percutaneous Coronary Intervention Aspiration Thrombectomy;  Surgeon: Mira Rodriguez MD;  Location: [facility] CV     CV PCI STENT DRUG ELUTING N/A 11/6/2022    Procedure: Percutaneous Coronary Intervention Stent;  Surgeon: Mira Rodriguez MD;  Location: [facility] CV    No family history on file. Social History     Socioeconomic History     Marital status:      Spouse name: Not on file     Number of children: Not on file     Years of education: Not on file     Highest education level: Not on file   Occupational History     Not on file   Tobacco Use     Smoking status: Never     Smokeless tobacco: Never   Vaping Use     Vaping Use: Never used   Substance and Sexual Activity     Alcohol use: Not on file     Drug use: Not on file     Sexual activity: Not on file   Other Topics Concern     Not on file   Social History Narrative     Not on file     Social Determinants of Health     Financial Resource Strain: Not on file   Food Insecurity: Not on file   Transportation Needs: Not on file   Physical Activity: Not on file   Stress: Not on file   Social Connections: Not on file   Intimate Partner Violence: Not on file   Housing Stability: Not on file          Medications  Allergies   Current Outpatient Medications   Medication Sig Dispense Refill     allopurinol (ZYLOPRIM) 300 MG tablet Take 1 tablet by mouth daily       aspirin (ASA) 81 MG chewable tablet Take 1 tablet (81 mg) by mouth daily Starting tomorrow. 30 tablet 3     atorvastatin (LIPITOR) 80 MG tablet Take 1 tablet (80 mg) by mouth daily 90 tablet 3     lisinopril (ZESTRIL) 2.5 MG tablet Take 1 tablet (2.5 mg) by mouth daily 30 tablet 1     nitroGLYcerin (NITROSTAT) 0.4 MG sublingual tablet One tablet under the tongue every 5 minutes if needed for chest pain. May repeat every 5 minutes for a maximum  "of 3 doses in 15 minutes\" 25 tablet 3     ticagrelor (BRILINTA) 90 MG tablet Take 1 tablet (90 mg) by mouth 2 times daily Dose to start tomorrow morning. 180 tablet 3    No Known Allergies      Lab Results    Chemistry/lipid CBC Cardiac Enzymes/BNP/TSH/INR   Lab Results   Component Value Date    CHOL 160 11/07/2022    HDL 38 (L) 11/07/2022    TRIG 125 11/07/2022    BUN 12.5 11/07/2022     11/07/2022    CO2 23 11/07/2022    Lab Results   Component Value Date    WBC 13.6 (H) 11/07/2022    HGB 13.9 11/08/2022    HCT 40.3 11/08/2022    MCV 88 11/07/2022     11/08/2022    Lab Results   Component Value Date    TSH 0.60 06/21/2018    INR 1.15 11/06/2022        46 minutes spent on the date of encounter doing chart review, review of test results, interpretation with above tests, patient visit, documentation, discussion with other provider and discussion with family.        This note has been dictated using voice recognition software. Any grammatical, typographical, or context distortions are unintentional and inherent to the software      "

## 2022-11-14 ENCOUNTER — OFFICE VISIT (OUTPATIENT)
Dept: CARDIOLOGY | Facility: CLINIC | Age: 57
End: 2022-11-14
Payer: COMMERCIAL

## 2022-11-14 VITALS
HEIGHT: 65 IN | RESPIRATION RATE: 16 BRPM | DIASTOLIC BLOOD PRESSURE: 73 MMHG | BODY MASS INDEX: 28.82 KG/M2 | HEART RATE: 67 BPM | SYSTOLIC BLOOD PRESSURE: 111 MMHG | WEIGHT: 173 LBS

## 2022-11-14 DIAGNOSIS — I25.5 ISCHEMIC CARDIOMYOPATHY: ICD-10-CM

## 2022-11-14 DIAGNOSIS — E78.5 DYSLIPIDEMIA, GOAL LDL BELOW 70: ICD-10-CM

## 2022-11-14 DIAGNOSIS — I10 PRIMARY HYPERTENSION: ICD-10-CM

## 2022-11-14 DIAGNOSIS — I21.3 ST ELEVATION MYOCARDIAL INFARCTION (STEMI), UNSPECIFIED ARTERY (H): Primary | ICD-10-CM

## 2022-11-14 PROCEDURE — 99215 OFFICE O/P EST HI 40 MIN: CPT | Performed by: NURSE PRACTITIONER

## 2022-11-14 RX ORDER — NITROGLYCERIN 0.4 MG/1
TABLET SUBLINGUAL
Qty: 25 TABLET | Refills: 3 | Status: SHIPPED | OUTPATIENT
Start: 2022-11-14

## 2022-11-14 RX ORDER — LISINOPRIL 2.5 MG/1
2.5 TABLET ORAL DAILY
Qty: 30 TABLET | Refills: 1 | Status: SHIPPED | OUTPATIENT
Start: 2022-11-14 | End: 2022-11-15

## 2022-11-14 RX ORDER — ATORVASTATIN CALCIUM 80 MG/1
80 TABLET, FILM COATED ORAL DAILY
Qty: 90 TABLET | Refills: 3 | Status: SHIPPED | OUTPATIENT
Start: 2022-11-14 | End: 2024-01-22

## 2022-11-14 NOTE — PATIENT INSTRUCTIONS
Kvng Maharaj,    It was a pleasure to see you today at the [facility].     My recommendations after this visit include:    - Start Lisinopril 2.5 mg daily.    - Please call if you develop lightheaded or dizziness    - Please seek medical attention if you develop recurrent chest pain or shortness of breath or similar symptoms you experienced prior to recent cardiac event    - Please get your lipid level test  and kidney and liver (CMP) in 4 weeks. Make sure to fast for 8-12 hours prior to lab work. Okay to have plain water, black coffee or tea without creamer and sugar    - Cardiac rehab as scheduled    - Follow up with Dr. Rodriguez in 4-6 weeks     - Please call [phone], if you have any questions or concerns    Laurel Pérez CNP    Medication     Take all your medications as prescribed  Do not stop any medications without talking with a healthcare provider    Exercise      Physical activity is important for overall health  Set a goal of 150 minutes of exercise each week  For example, 30 minutes of exercise 5 days each week.    These 30 minutes can be broken into shorter periods of 15 minutes twice daily or 10 minutes three times daily  Start any exercise program slowly and work towards the goal of 150 minutes each week  For example, you may start with 10 minutes and plan to add a few minutes each week as you get stronger   Examples of exercise include walking, swimming, or biking  Remember to stretch and stay hydrated with exercise    Diet     A heart healthy diet includes:  A variety of fruits and vegetables  Whole grains  Low-fat dairy (fat-free, 1% fat, and low-fat)  Lean meats and poultry without skin   Fish (eat fish 2 times each week)  Nuts  Limit saturated fat to about 13 grams each day (based on a 2000 calorie diet)  Limit red meat  Limit sugars (sweets and sugary beverages)  Limit your portion sizes  Do not add salt to your food when cooking or at the table  Limit alcohol  intake (no more than 1 drink each day for women or 2 drinks each day for men)    Weight Loss     Work on losing weight with diet and exercise  You BMI (body mass index) should be between 18.5-24.9  This is a calculation of your weight and height  Please ask your healthcare provider for your BMI    Manage Other Chronic Health Conditions     Control cholesterol  Eat a diet low in saturated fat  Exercise   Take a statin medication as prescribed  Manage blood pressure  Eat a diet low in sodium  Exercise  Reduce stress  Lose weight   Take blood pressure medications as prescribed  Control blood sugars if diabetic  Monitor sugars and carbohydrates in your diet  Lose weight   Take diabetes medications as prescribed  Follow-up with your primary care provider to make sure your blood sugars are well controlled    Stress Reduction     Find time each day to relax  Reading, listening to music, yoga, meditation, exercise, spending time with friends and family, volunteering   Get 6-8 hours of sleep each night    Smoking Cessation     Smoking causes numerous health problems including coronary artery disease  It is never too late to quit  Set realistic goals for quitting  Decrease the number of cigarettes used each week  Use nicotine gum or patches to help you quit    Information from the [employer].  Please visit their website at www.heart.org

## 2022-11-14 NOTE — LETTER
11/14/2022    Scooby Desouza MD  [address]  [facility] [address]    RE: Kvng Maharaj       Dear Colleague,     I had the pleasure of seeing Kvng Maharaj in the [facility].          Assessment/Recommendations   Assessment:    1.  Coronary artery disease with status post STEMI and status post thrombectomy with PCI/SUJEY to distal RCA and known moderate to severe mid LAD lesion and mild to moderate disease in first diagonal.    On dual antiplatelet therapy with ASA 81 mg indefinitely and Ticagrelor (Brilinta) 90 mg twice a day for 12 months.  Patient denies any chest pain or angina.    Cardiac rehab has been ordered/scheduled    2.  Dyslipidemia with LDL goal <70/Obesity with a BMI of: Kvng Maharaj is on high intensity statin therapy with atorvastatin 80 Mg daily. Most recent LDL is 97.  Most recent AST is elevated at 284.  Patient is asymptomatic.  This could be due to recent acute STEMI.    3.  Hypertension: Blood pressure today stable at 111/73.  He was on lisinopril-hydrochlorothiazide with 20 Mg-12.5 mg daily.  This was discontinued in the hospital.    4.  Ischemic cardiomyopathy with mild systolic dysfunction with LVEF of 45 to 50% per echo on 11/6/2022: He is well compensated with no evidence of fluid tension assessment.  He is currently not on beta-blocker therapy.  His heart rate was in the 50s in the hospital recently.  Heart rate today stable in mid 60s.        Plan:  - We discussed the importance of antiplatelet therapy and talking with his cardiologist prior to stopping these medications for any reason.  We discussed about utilization of as needed nitroglycerin.     -Resume lisinopril at 2.5 mg daily for cardiomyopathy with mildly reduced ejection fraction.    -We will continue to monitor his heart rate in cardiac rehab and will consider adding low-dose of beta-blocker therapy if stable heart rate in the near future.    - Encouraged to seek medical attention if recurrent chest  pain or shortness of breath.    - Risk factor modification and lifestyle management topics were discussed including managing comorbidities, weight loss, heart healthy diet, exercise and stress reduction.      - Fasting lipid profile check and CMP in 4 to 6 weeks    - Cardiac rehab as scheduled/schedule cardiac rehab    - We discussed a diet low in saturated fat, weight loss, and exercise along with medication for better control of cholesterol.  Highly encouraged to participate in nutrition class in cardiac rehab.    Patient was recommended to consider staged PCI to LAD.  Will discuss with Dr. Rodriguez and have DAVID Hernández follow-up with patient.    Follow up with Dr. Rodriguez as scheduled on February 28.     History of Present Illness/Subjective    Mr. Kvng Maharaj is a 57 year old male with a past medical history of hypertension who came in with chief complaint of chest discomfort with some numbness in his teeth and tongue post exercise.  He was hospitalized from November 6 2 number eighth due to acute STEMI.  Patient underwent successful thrombectomy/drug-eluting stent placement to lesion in distal RCA.  He was recommended to consider staged PCI to LAD in the near future.    Patient is here today with his wife  for post coronary intervention follow up.     He denies fatigue, lightheadedness, shortness of breath, dyspnea on exertion, orthopnea, PND, palpitations, chest pain, abdominal fullness/bloating and lower extremity edema.  He denies any adverse effect to medications.  He denies any bleeding complications.  Patient and his wife had several questions about recent hospitalization, current angiogram and medications and upcoming appointments and future planning and treatment.  This was discussed in detail.    Coronary Angiogram done on 11/6/22: reviewed:  Conclusion    Acute inferior and lateral STEMI in a patient with a family history of premature coronary disease.    Patent left main.    There is diffuse  mild LAD disease with a focal moderate-severe mid LAD lesion. The first diagonal is a large branching system with mild-moderate disease.    The circumflex is grossly normal.    The RCA is a large ectatic artery with a distal RCA ulcerated and thrombotic stenosis with evidence of distal emboli to the two lateral PL branches. We started with thrombectomy, followed by IVUS for stent sizing and then stenting with overlapping Megatron 5.0 mm SUJEY dilated to 5.5 mm. There was further distal embolization to the PDA during the stenting. Intracoronary adenosine 300mcg and ntg 200mg were given through a twin pass catheter in the distal RCA with some improvement in the distal PDA flow, but not the PL branches. Integrillin bolus x 2 given and gtt started, as was a low dose NTG gtt to augment microvascular flow and clot break down. Chest pain remained 3/10 and did not change during the procedure. Challenging PCI due to vessel ectasia and heavy thrombus burden.    LV EDP 11 mmHg.         Recommendations    General Recommendations:  - Follow up visit with Nurse Practitioner in 1-2 weeks.   - Recommended follow up with doctor Dr. Rodriguez in 6 weeks.   - Arterial sheath removed from radial artery with TR Band placement.   - Recommend cardiac rehabilitation.     Medications:  - Continue dual antiplatelet therapy for 12 month(s).   - Continue high dose statin therapy indefinitely.   - Risk factor management for atherosclerosis.     Percutaneous Therapy:   - Consider PCI treatment of stenosis in the LAD.     Other:  - Continue integrillin gtt for 18 hours and NTG gtt as tolerated for distal embolization         ECHO done on 11/6/22-Reviewed:   Interpretation Summary     Normal left ventricular size. Left ventricular ejection fraction is mildly  reduced. Left ventricular ejection fraction estimated 45 to 50%. The distal  septum and apex appears hypokinetic to akinetic. The basilar inferior wall  appears mildly hypokinetic.  Diastolic  "Doppler findings (E/E' ratio and/or other parameters) suggest left  ventricular filling pressures are normal.  Normal right ventricular size and systolic function  No observed hemodynamically significant valve abnormality  The proximal ascending aorta measures mildly enlarged at 4.0 cm.       Physical Examination Review of Systems   /73 (BP Location: Right arm, Patient Position: Sitting, Cuff Size: Adult Regular)   Pulse 67   Resp 16   Ht 1.651 m (5' 5\")   Wt 78.5 kg (173 lb)   BMI 28.79 kg/m    Body mass index is 28.79 kg/m .  Wt Readings from Last 3 Encounters:   11/14/22 78.5 kg (173 lb)   11/08/22 79.1 kg (174 lb 6.1 oz)     General Appearance:   no distress, normal body habitus   ENT/Mouth: membranes moist, no oral lesions or bleeding gums.      EYES:  no scleral icterus, normal conjunctivae   Neck: no carotid bruits or thyromegaly   Chest/Lungs:   lungs are clear to auscultation, no rales or wheezing, equal chest wall expansion    Cardiovascular:   Heart rate regular. Normal first and second heart sounds with no murmurs, rubs, or gallops; the carotid, radial and posterior tibial pulses are intact, Jugular venous pressure flat with no edema bilaterally    Abdomen:  no organomegaly, masses, bruits, or tenderness; bowel sounds are present   Extremities  Puncture Site: no cyanosis or clubbing  Right radial site intact.  Radial pulses and Pedal pulses intact and symmetrical.  CMS intact.   Skin: no xanthelasma, warm.    Neurologic: normal  bilateral, no tremors     Psychiatric: alert and oriented x3, calm                                                      Negative unless noted in HPI     Medical History  Surgical History Family History Social History   No past medical history on file. Past Surgical History:   Procedure Laterality Date     CV CORONARY ANGIOGRAM N/A 11/6/2022    Procedure: Coronary Angiogram;  Surgeon: Mira Rodriguez MD;  Location: [facility] CATH LAB CV     CV INTRAVASULAR " ULTRASOUND N/A 11/6/2022    Procedure: Intravascular Ultrasound;  Surgeon: Mira Rodriguez MD;  Location: [facility] CV     CV LEFT HEART CATH N/A 11/6/2022    Procedure: Left Heart Catheterization;  Surgeon: Mira Rodriguez MD;  Location: [facility] CV     CV PCI ASPIRATION THROMECTOMY N/A 11/6/2022    Procedure: Percutaneous Coronary Intervention Aspiration Thrombectomy;  Surgeon: Mira Rodriguez MD;  Location: [facility] CV     CV PCI STENT DRUG ELUTING N/A 11/6/2022    Procedure: Percutaneous Coronary Intervention Stent;  Surgeon: Mira Rodriguez MD;  Location: [facility] LAB CV    No family history on file. Social History     Socioeconomic History     Marital status:      Spouse name: Not on file     Number of children: Not on file     Years of education: Not on file     Highest education level: Not on file   Occupational History     Not on file   Tobacco Use     Smoking status: Never     Smokeless tobacco: Never   Vaping Use     Vaping Use: Never used   Substance and Sexual Activity     Alcohol use: Not on file     Drug use: Not on file     Sexual activity: Not on file   Other Topics Concern     Not on file   Social History Narrative     Not on file     Social Determinants of Health     Financial Resource Strain: Not on file   Food Insecurity: Not on file   Transportation Needs: Not on file   Physical Activity: Not on file   Stress: Not on file   Social Connections: Not on file   Intimate Partner Violence: Not on file   Housing Stability: Not on file          Medications  Allergies   Current Outpatient Medications   Medication Sig Dispense Refill     allopurinol (ZYLOPRIM) 300 MG tablet Take 1 tablet by mouth daily       aspirin (ASA) 81 MG chewable tablet Take 1 tablet (81 mg) by mouth daily Starting tomorrow. 30 tablet 3     atorvastatin (LIPITOR) 80 MG tablet Take 1 tablet (80 mg) by mouth daily 90 tablet 3     lisinopril (ZESTRIL) 2.5 MG tablet Take 1 tablet (2.5 mg) by  "mouth daily 30 tablet 1     nitroGLYcerin (NITROSTAT) 0.4 MG sublingual tablet One tablet under the tongue every 5 minutes if needed for chest pain. May repeat every 5 minutes for a maximum of 3 doses in 15 minutes\" 25 tablet 3     ticagrelor (BRILINTA) 90 MG tablet Take 1 tablet (90 mg) by mouth 2 times daily Dose to start tomorrow morning. 180 tablet 3    No Known Allergies      Lab Results    Chemistry/lipid CBC Cardiac Enzymes/BNP/TSH/INR   Lab Results   Component Value Date    CHOL 160 11/07/2022    HDL 38 (L) 11/07/2022    TRIG 125 11/07/2022    BUN 12.5 11/07/2022     11/07/2022    CO2 23 11/07/2022    Lab Results   Component Value Date    WBC 13.6 (H) 11/07/2022    HGB 13.9 11/08/2022    HCT 40.3 11/08/2022    MCV 88 11/07/2022     11/08/2022    Lab Results   Component Value Date    TSH 0.60 06/21/2018    INR 1.15 11/06/2022        46 minutes spent on the date of encounter doing chart review, review of test results, interpretation with above tests, patient visit, documentation, discussion with other provider and discussion with family.        This note has been dictated using voice recognition software. Any grammatical, typographical, or context distortions are unintentional and inherent to the software          Thank you for allowing me to participate in the care of your patient.      Sincerely,     BEULAH Peña [facility]  cc:   No referring provider defined for this encounter.        "

## 2022-11-15 ENCOUNTER — TRANSFERRED RECORDS (OUTPATIENT)
Dept: HEALTH INFORMATION MANAGEMENT | Facility: CLINIC | Age: 57
End: 2022-11-15

## 2022-11-18 ENCOUNTER — TELEPHONE (OUTPATIENT)
Dept: CARDIOLOGY | Facility: CLINIC | Age: 57
End: 2022-11-18

## 2022-11-18 DIAGNOSIS — I21.3 ST ELEVATION MYOCARDIAL INFARCTION (STEMI), UNSPECIFIED ARTERY (H): Primary | ICD-10-CM

## 2022-11-18 DIAGNOSIS — I25.5 ISCHEMIC CARDIOMYOPATHY: ICD-10-CM

## 2022-11-18 DIAGNOSIS — I10 PRIMARY HYPERTENSION: ICD-10-CM

## 2022-11-18 NOTE — TELEPHONE ENCOUNTER
===View-only below this line===  ----- Message -----  From: Mira Rodriguez MD  Sent: 11/15/2022   5:54 PM CST  To: Pascual Avila RN, BEULAH Peña CNP    I just looked at his angiogram.  The blockage isn't very severe.  I would like to get an exercise nuclear stress test on him in 1 month to see if there is a flow limitation there.     Thanks, EG    PC with patient and review of recommendations from provider. Pt agreeable to testing. Order placed. Staff message sent to schedulers to call and arrange testing. Pt is not on a BB. No further questions. BONILLA,Rn

## 2022-11-29 ENCOUNTER — HOSPITAL ENCOUNTER (OUTPATIENT)
Dept: CARDIAC REHAB | Facility: HOSPITAL | Age: 57
Discharge: HOME | End: 2022-11-29
Attending: INTERNAL MEDICINE
Payer: COMMERCIAL

## 2022-11-29 DIAGNOSIS — I21.3 ST ELEVATION MYOCARDIAL INFARCTION (STEMI), UNSPECIFIED ARTERY (H): ICD-10-CM

## 2022-11-29 PROCEDURE — 93797 PHYS/QHP OP CAR RHAB WO ECG: CPT

## 2022-11-29 PROCEDURE — 93798 PHYS/QHP OP CAR RHAB W/ECG: CPT

## 2022-12-01 ENCOUNTER — HOSPITAL ENCOUNTER (OUTPATIENT)
Dept: CARDIAC REHAB | Facility: HOSPITAL | Age: 57
Discharge: HOME | End: 2022-12-01
Attending: INTERNAL MEDICINE
Payer: COMMERCIAL

## 2022-12-01 PROCEDURE — 93798 PHYS/QHP OP CAR RHAB W/ECG: CPT

## 2022-12-05 ENCOUNTER — HOSPITAL ENCOUNTER (OUTPATIENT)
Dept: CARDIAC REHAB | Facility: HOSPITAL | Age: 57
Discharge: HOME | End: 2022-12-05
Attending: INTERNAL MEDICINE
Payer: COMMERCIAL

## 2022-12-05 PROCEDURE — 93798 PHYS/QHP OP CAR RHAB W/ECG: CPT

## 2022-12-07 ENCOUNTER — TRANSFERRED RECORDS (OUTPATIENT)
Dept: HEALTH INFORMATION MANAGEMENT | Facility: CLINIC | Age: 57
End: 2022-12-07

## 2022-12-07 ENCOUNTER — LAB REQUISITION (OUTPATIENT)
Dept: LAB | Facility: CLINIC | Age: 57
End: 2022-12-07

## 2022-12-07 ENCOUNTER — HOSPITAL ENCOUNTER (OUTPATIENT)
Dept: CARDIAC REHAB | Facility: HOSPITAL | Age: 57
Discharge: HOME | End: 2022-12-07
Attending: INTERNAL MEDICINE
Payer: COMMERCIAL

## 2022-12-07 DIAGNOSIS — I25.10 ATHEROSCLEROTIC HEART DISEASE OF NATIVE CORONARY ARTERY WITHOUT ANGINA PECTORIS: ICD-10-CM

## 2022-12-07 LAB
ALBUMIN SERPL BCG-MCNC: 4.3 G/DL (ref 3.5–5.2)
ALP SERPL-CCNC: 106 U/L (ref 40–129)
ALT SERPL W P-5'-P-CCNC: 38 U/L (ref 10–50)
ANION GAP SERPL CALCULATED.3IONS-SCNC: 21 MMOL/L (ref 7–15)
AST SERPL W P-5'-P-CCNC: 38 U/L (ref 10–50)
BILIRUB SERPL-MCNC: 0.5 MG/DL
BUN SERPL-MCNC: 8.6 MG/DL (ref 6–20)
CALCIUM SERPL-MCNC: 10 MG/DL (ref 8.6–10)
CHLORIDE SERPL-SCNC: 104 MMOL/L (ref 98–107)
CHOLEST SERPL-MCNC: 76 MG/DL
CREAT SERPL-MCNC: 1.01 MG/DL (ref 0.67–1.17)
DEPRECATED HCO3 PLAS-SCNC: 17 MMOL/L (ref 22–29)
ERYTHROCYTE [DISTWIDTH] IN BLOOD BY AUTOMATED COUNT: 13.3 % (ref 10–15)
GFR SERPL CREATININE-BSD FRML MDRD: 86 ML/MIN/1.73M2
GLUCOSE SERPL-MCNC: 102 MG/DL (ref 70–99)
HCT VFR BLD AUTO: 41.6 % (ref 40–53)
HDLC SERPL-MCNC: 40 MG/DL
HGB BLD-MCNC: 13.6 G/DL (ref 13.3–17.7)
LDLC SERPL CALC-MCNC: 23 MG/DL
MCH RBC QN AUTO: 30.1 PG (ref 26.5–33)
MCHC RBC AUTO-ENTMCNC: 32.7 G/DL (ref 31.5–36.5)
MCV RBC AUTO: 92 FL (ref 78–100)
NONHDLC SERPL-MCNC: 36 MG/DL
PLATELET # BLD AUTO: 255 10E3/UL (ref 150–450)
POTASSIUM SERPL-SCNC: 4.1 MMOL/L (ref 3.4–5.3)
PROT SERPL-MCNC: 6.9 G/DL (ref 6.4–8.3)
RBC # BLD AUTO: 4.52 10E6/UL (ref 4.4–5.9)
SODIUM SERPL-SCNC: 142 MMOL/L (ref 136–145)
TRIGL SERPL-MCNC: 66 MG/DL
WBC # BLD AUTO: 7 10E3/UL (ref 4–11)

## 2022-12-07 PROCEDURE — 80061 LIPID PANEL: CPT | Performed by: FAMILY MEDICINE

## 2022-12-07 PROCEDURE — 80053 COMPREHEN METABOLIC PANEL: CPT | Performed by: FAMILY MEDICINE

## 2022-12-07 PROCEDURE — 85027 COMPLETE CBC AUTOMATED: CPT | Performed by: FAMILY MEDICINE

## 2022-12-07 PROCEDURE — 93798 PHYS/QHP OP CAR RHAB W/ECG: CPT

## 2022-12-08 ENCOUNTER — HOSPITAL ENCOUNTER (OUTPATIENT)
Dept: CARDIAC REHAB | Facility: HOSPITAL | Age: 57
Discharge: HOME | End: 2022-12-08
Attending: INTERNAL MEDICINE
Payer: COMMERCIAL

## 2022-12-08 PROCEDURE — 93798 PHYS/QHP OP CAR RHAB W/ECG: CPT

## 2022-12-12 ENCOUNTER — HOSPITAL ENCOUNTER (OUTPATIENT)
Dept: CARDIAC REHAB | Facility: HOSPITAL | Age: 57
Discharge: HOME | End: 2022-12-12
Attending: INTERNAL MEDICINE
Payer: COMMERCIAL

## 2022-12-12 PROCEDURE — 93798 PHYS/QHP OP CAR RHAB W/ECG: CPT

## 2022-12-14 ENCOUNTER — HOSPITAL ENCOUNTER (OUTPATIENT)
Dept: CARDIAC REHAB | Facility: HOSPITAL | Age: 57
Discharge: HOME | End: 2022-12-14
Attending: INTERNAL MEDICINE
Payer: COMMERCIAL

## 2022-12-14 PROCEDURE — 93798 PHYS/QHP OP CAR RHAB W/ECG: CPT

## 2022-12-15 ENCOUNTER — HOSPITAL ENCOUNTER (OUTPATIENT)
Dept: CARDIAC REHAB | Facility: HOSPITAL | Age: 57
Discharge: HOME | End: 2022-12-15
Attending: INTERNAL MEDICINE
Payer: COMMERCIAL

## 2022-12-15 PROCEDURE — 93798 PHYS/QHP OP CAR RHAB W/ECG: CPT

## 2022-12-19 ENCOUNTER — HOSPITAL ENCOUNTER (OUTPATIENT)
Dept: NUCLEAR MEDICINE | Facility: HOSPITAL | Age: 57
Discharge: HOME | End: 2022-12-19
Attending: INTERNAL MEDICINE
Payer: COMMERCIAL

## 2022-12-19 ENCOUNTER — HOSPITAL ENCOUNTER (OUTPATIENT)
Dept: CARDIOLOGY | Facility: HOSPITAL | Age: 57
Discharge: HOME | End: 2022-12-19
Attending: INTERNAL MEDICINE
Payer: COMMERCIAL

## 2022-12-19 DIAGNOSIS — I21.3 ST ELEVATION MYOCARDIAL INFARCTION (STEMI), UNSPECIFIED ARTERY (H): ICD-10-CM

## 2022-12-19 DIAGNOSIS — I10 PRIMARY HYPERTENSION: ICD-10-CM

## 2022-12-19 DIAGNOSIS — I25.5 ISCHEMIC CARDIOMYOPATHY: ICD-10-CM

## 2022-12-19 LAB
CV STRESS MAX HR HE: 125
NUC STRESS EJECTION FRACTION: 55 %
RATE PRESSURE PRODUCT: NORMAL
STRESS ECHO BASELINE DIASTOLIC HE: 92
STRESS ECHO BASELINE HR: 62 BPM
STRESS ECHO BASELINE SYSTOLIC BP: 154
STRESS ECHO CALCULATED PERCENT HR: 77 %
STRESS ECHO LAST STRESS DIASTOLIC BP: 70
STRESS ECHO LAST STRESS SYSTOLIC BP: 166
STRESS ECHO POST EXERCISE DUR MIN: 9 MIN
STRESS ECHO POST EXERCISE DUR SEC: 0 SEC
STRESS ECHO TARGET HR: 162

## 2022-12-19 PROCEDURE — 93017 CV STRESS TEST TRACING ONLY: CPT

## 2022-12-19 PROCEDURE — 78452 HT MUSCLE IMAGE SPECT MULT: CPT | Mod: 26 | Performed by: INTERNAL MEDICINE

## 2022-12-19 PROCEDURE — 93016 CV STRESS TEST SUPVJ ONLY: CPT | Performed by: INTERNAL MEDICINE

## 2022-12-19 PROCEDURE — A9500 TC99M SESTAMIBI: HCPCS | Performed by: INTERNAL MEDICINE

## 2022-12-19 PROCEDURE — 343N000001 HC RX 343: Performed by: INTERNAL MEDICINE

## 2022-12-19 PROCEDURE — 78452 HT MUSCLE IMAGE SPECT MULT: CPT

## 2022-12-19 PROCEDURE — 93018 CV STRESS TEST I&R ONLY: CPT | Performed by: INTERNAL MEDICINE

## 2022-12-19 RX ADMIN — Medication 30.3 MILLICURIE: at 09:32

## 2022-12-19 RX ADMIN — Medication 8.8 MILLICURIE: at 07:54

## 2022-12-21 ENCOUNTER — HOSPITAL ENCOUNTER (OUTPATIENT)
Dept: CARDIAC REHAB | Facility: HOSPITAL | Age: 57
Discharge: HOME | End: 2022-12-21
Attending: INTERNAL MEDICINE
Payer: COMMERCIAL

## 2022-12-21 PROCEDURE — 93798 PHYS/QHP OP CAR RHAB W/ECG: CPT

## 2022-12-22 ENCOUNTER — HOSPITAL ENCOUNTER (OUTPATIENT)
Dept: CARDIAC REHAB | Facility: HOSPITAL | Age: 57
Discharge: HOME | End: 2022-12-22
Attending: INTERNAL MEDICINE
Payer: COMMERCIAL

## 2022-12-22 PROCEDURE — 93798 PHYS/QHP OP CAR RHAB W/ECG: CPT

## 2022-12-26 ENCOUNTER — HOSPITAL ENCOUNTER (OUTPATIENT)
Dept: CARDIAC REHAB | Facility: HOSPITAL | Age: 57
Discharge: HOME | End: 2022-12-26
Attending: INTERNAL MEDICINE
Payer: COMMERCIAL

## 2022-12-26 PROCEDURE — 93798 PHYS/QHP OP CAR RHAB W/ECG: CPT

## 2022-12-28 ENCOUNTER — HOSPITAL ENCOUNTER (OUTPATIENT)
Dept: CARDIAC REHAB | Facility: HOSPITAL | Age: 57
Discharge: HOME | End: 2022-12-28
Attending: INTERNAL MEDICINE
Payer: COMMERCIAL

## 2022-12-28 PROCEDURE — 93798 PHYS/QHP OP CAR RHAB W/ECG: CPT

## 2022-12-29 ENCOUNTER — HOSPITAL ENCOUNTER (OUTPATIENT)
Dept: CARDIAC REHAB | Facility: HOSPITAL | Age: 57
Discharge: HOME | End: 2022-12-29
Attending: INTERNAL MEDICINE
Payer: COMMERCIAL

## 2022-12-29 PROCEDURE — 93798 PHYS/QHP OP CAR RHAB W/ECG: CPT

## 2023-01-02 ENCOUNTER — HOSPITAL ENCOUNTER (OUTPATIENT)
Dept: CARDIAC REHAB | Facility: HOSPITAL | Age: 58
Discharge: HOME | End: 2023-01-02
Attending: INTERNAL MEDICINE
Payer: COMMERCIAL

## 2023-01-02 PROCEDURE — 93798 PHYS/QHP OP CAR RHAB W/ECG: CPT

## 2023-01-04 ENCOUNTER — HOSPITAL ENCOUNTER (OUTPATIENT)
Dept: CARDIAC REHAB | Facility: HOSPITAL | Age: 58
Discharge: HOME | End: 2023-01-04
Attending: INTERNAL MEDICINE
Payer: COMMERCIAL

## 2023-01-04 PROCEDURE — 93798 PHYS/QHP OP CAR RHAB W/ECG: CPT

## 2023-01-05 ENCOUNTER — HOSPITAL ENCOUNTER (OUTPATIENT)
Dept: CARDIAC REHAB | Facility: HOSPITAL | Age: 58
Discharge: HOME | End: 2023-01-05
Attending: INTERNAL MEDICINE
Payer: COMMERCIAL

## 2023-01-05 PROCEDURE — 93798 PHYS/QHP OP CAR RHAB W/ECG: CPT

## 2023-01-09 ENCOUNTER — HOSPITAL ENCOUNTER (OUTPATIENT)
Dept: CARDIAC REHAB | Facility: HOSPITAL | Age: 58
Discharge: HOME | End: 2023-01-09
Attending: INTERNAL MEDICINE
Payer: COMMERCIAL

## 2023-01-09 PROCEDURE — 93798 PHYS/QHP OP CAR RHAB W/ECG: CPT

## 2023-01-11 ENCOUNTER — HOSPITAL ENCOUNTER (OUTPATIENT)
Dept: CARDIAC REHAB | Facility: HOSPITAL | Age: 58
Discharge: HOME | End: 2023-01-11
Attending: INTERNAL MEDICINE
Payer: COMMERCIAL

## 2023-01-11 PROCEDURE — 93798 PHYS/QHP OP CAR RHAB W/ECG: CPT

## 2023-01-12 ENCOUNTER — HOSPITAL ENCOUNTER (OUTPATIENT)
Dept: CARDIAC REHAB | Facility: HOSPITAL | Age: 58
Discharge: HOME | End: 2023-01-12
Attending: INTERNAL MEDICINE
Payer: COMMERCIAL

## 2023-01-12 LAB
ATRIAL RATE - MUSE: 64 BPM
DIASTOLIC BLOOD PRESSURE - MUSE: NORMAL MMHG
INTERPRETATION ECG - MUSE: NORMAL
P AXIS - MUSE: 39 DEGREES
PR INTERVAL - MUSE: 220 MS
QRS DURATION - MUSE: 82 MS
QT - MUSE: 398 MS
QTC - MUSE: 410 MS
R AXIS - MUSE: 44 DEGREES
SYSTOLIC BLOOD PRESSURE - MUSE: NORMAL MMHG
T AXIS - MUSE: 66 DEGREES
VENTRICULAR RATE- MUSE: 64 BPM

## 2023-01-12 PROCEDURE — 93798 PHYS/QHP OP CAR RHAB W/ECG: CPT

## 2023-01-16 ENCOUNTER — HOSPITAL ENCOUNTER (OUTPATIENT)
Dept: CARDIAC REHAB | Facility: HOSPITAL | Age: 58
Discharge: HOME | End: 2023-01-16
Attending: INTERNAL MEDICINE
Payer: COMMERCIAL

## 2023-01-16 PROCEDURE — 93798 PHYS/QHP OP CAR RHAB W/ECG: CPT

## 2023-01-18 ENCOUNTER — HOSPITAL ENCOUNTER (OUTPATIENT)
Dept: CARDIAC REHAB | Facility: HOSPITAL | Age: 58
Discharge: HOME | End: 2023-01-18
Attending: INTERNAL MEDICINE
Payer: COMMERCIAL

## 2023-01-18 PROCEDURE — 93798 PHYS/QHP OP CAR RHAB W/ECG: CPT

## 2023-01-19 ENCOUNTER — HOSPITAL ENCOUNTER (OUTPATIENT)
Dept: CARDIAC REHAB | Facility: HOSPITAL | Age: 58
Discharge: HOME | End: 2023-01-19
Attending: INTERNAL MEDICINE
Payer: COMMERCIAL

## 2023-01-19 PROCEDURE — 93798 PHYS/QHP OP CAR RHAB W/ECG: CPT

## 2023-01-23 ENCOUNTER — HOSPITAL ENCOUNTER (OUTPATIENT)
Dept: CARDIAC REHAB | Facility: HOSPITAL | Age: 58
Discharge: HOME | End: 2023-01-23
Attending: INTERNAL MEDICINE
Payer: COMMERCIAL

## 2023-01-23 PROCEDURE — 93798 PHYS/QHP OP CAR RHAB W/ECG: CPT

## 2023-01-25 ENCOUNTER — HOSPITAL ENCOUNTER (OUTPATIENT)
Dept: CARDIAC REHAB | Facility: HOSPITAL | Age: 58
Discharge: HOME | End: 2023-01-25
Attending: INTERNAL MEDICINE
Payer: COMMERCIAL

## 2023-01-25 PROCEDURE — 93798 PHYS/QHP OP CAR RHAB W/ECG: CPT

## 2023-01-26 ENCOUNTER — HOSPITAL ENCOUNTER (OUTPATIENT)
Dept: CARDIAC REHAB | Facility: HOSPITAL | Age: 58
Discharge: HOME | End: 2023-01-26
Attending: INTERNAL MEDICINE
Payer: COMMERCIAL

## 2023-01-26 PROCEDURE — 93798 PHYS/QHP OP CAR RHAB W/ECG: CPT

## 2023-01-30 ENCOUNTER — HOSPITAL ENCOUNTER (OUTPATIENT)
Dept: CARDIAC REHAB | Facility: HOSPITAL | Age: 58
Discharge: HOME | End: 2023-01-30
Attending: INTERNAL MEDICINE
Payer: COMMERCIAL

## 2023-01-30 PROCEDURE — 93798 PHYS/QHP OP CAR RHAB W/ECG: CPT

## 2023-02-01 ENCOUNTER — HOSPITAL ENCOUNTER (OUTPATIENT)
Dept: CARDIAC REHAB | Facility: HOSPITAL | Age: 58
Discharge: HOME | End: 2023-02-01
Attending: INTERNAL MEDICINE
Payer: COMMERCIAL

## 2023-02-01 PROCEDURE — 93798 PHYS/QHP OP CAR RHAB W/ECG: CPT

## 2023-02-04 ENCOUNTER — HEALTH MAINTENANCE LETTER (OUTPATIENT)
Age: 58
End: 2023-02-04

## 2023-02-06 ENCOUNTER — HOSPITAL ENCOUNTER (OUTPATIENT)
Dept: CARDIAC REHAB | Facility: HOSPITAL | Age: 58
Discharge: HOME | End: 2023-02-06
Attending: INTERNAL MEDICINE
Payer: COMMERCIAL

## 2023-02-06 PROCEDURE — 93798 PHYS/QHP OP CAR RHAB W/ECG: CPT

## 2023-02-06 PROCEDURE — 93797 PHYS/QHP OP CAR RHAB WO ECG: CPT | Mod: 59

## 2023-02-22 ENCOUNTER — OFFICE VISIT (OUTPATIENT)
Dept: CARDIOLOGY | Facility: CLINIC | Age: 58
End: 2023-02-22
Payer: COMMERCIAL

## 2023-02-22 VITALS
HEIGHT: 65 IN | WEIGHT: 151 LBS | SYSTOLIC BLOOD PRESSURE: 136 MMHG | HEART RATE: 47 BPM | RESPIRATION RATE: 12 BRPM | BODY MASS INDEX: 25.16 KG/M2 | DIASTOLIC BLOOD PRESSURE: 76 MMHG

## 2023-02-22 DIAGNOSIS — I25.5 ISCHEMIC CARDIOMYOPATHY: ICD-10-CM

## 2023-02-22 DIAGNOSIS — I21.3 ST ELEVATION MYOCARDIAL INFARCTION (STEMI), UNSPECIFIED ARTERY (H): ICD-10-CM

## 2023-02-22 DIAGNOSIS — I10 PRIMARY HYPERTENSION: ICD-10-CM

## 2023-02-22 DIAGNOSIS — E78.5 DYSLIPIDEMIA, GOAL LDL BELOW 70: ICD-10-CM

## 2023-02-22 PROCEDURE — 93000 ELECTROCARDIOGRAM COMPLETE: CPT | Mod: RTG | Performed by: INTERNAL MEDICINE

## 2023-02-22 PROCEDURE — 99214 OFFICE O/P EST MOD 30 MIN: CPT | Performed by: INTERNAL MEDICINE

## 2023-02-22 NOTE — LETTER
2/22/2023    Scooby Desouza MD  [address]  [facility] [address]    RE: Kvng Maharaj       Dear Colleague,     I had the pleasure of seeing Kvng Maharaj in the [facility].    HEART CARE ENCOUNTER CONSULTATON NOTE      [facility]  [phone]      Assessment/Recommendations   Assessment/Plan:  CAD s/p MI - doing well, asymptomatic. On aspirin and high intensity statin indefinitely and brillinta x 12 months.  Diet and exercise discussed.    Hyperlipidemia - LDL at goal on statin    Hypertension - creeping up, will double his lisinopril to 5mg daily and if his BP is still high in two weeks we can go up to 10mg daily.     Bradycardia - sinus by EKG today.  Serial changes of MI noted.    F/U 8 months (November)       History of Present Illness/Subjective    HPI: Kvng Maharaj is a 58 year old male with hypertension and a family history of early coronary disease in his father (late 30s, smoker) who suffered an inferior STEMI 11/2022. He had been roller skiing when he developed chest pain and jaw/tooth numbness. His RCA was ectatic and had a distal ulcerated, thrombotic lesion with evidence of embolization to the lateral PL branches treated with SUJEY. There was residual moderate-severe mid LAD disease. Echo EF was 45-50%. Stress test 12/2022 showed a non-transmural infarct of the inferior and inferior lateral walls with no ischemia and EF 55%.     Luis comes to cardiology today for a follow up visit. He has been doing well since his MI. Luis completed cardiac rehab without difficulty. He is back to Nordic skiing and did a marathon a few weeks ago. He was careful to keep his heart rates around 140 and felt great the whole time. There has been no chest pain, dyspnea, dizziness, fatigue, palpitations, pnd/orthopnea, or edema. He is tolerating his medication well but notes that his BP has been creeping back up. Luis says that prior to his MI he was on a much higher dose of  "lisinopril as well at hydrochlorothiazide. He is now vegan and has lost 20 + lbs.       Physical Examination  Review of Systems   Vitals: /76 (BP Location: Right arm, Patient Position: Sitting, Cuff Size: Adult Regular)   Pulse (!) 47   Resp 12   Ht 1.651 m (5' 5\")   Wt 68.5 kg (151 lb)   BMI 25.13 kg/m    BMI= Body mass index is 25.13 kg/m .  Wt Readings from Last 3 Encounters:   02/22/23 68.5 kg (151 lb)   11/14/22 78.5 kg (173 lb)   11/08/22 79.1 kg (174 lb 6.1 oz)       General Appearance:   no distress, thin body habitus   ENT/Mouth: membranes moist, no oral lesions or bleeding gums.      EYES:  no scleral icterus, normal conjunctivae   Neck: no carotid bruits or thyromegaly   Chest/Lungs:   lungs are clear to auscultation, no rales or wheezing, no sternal scar, equal chest wall expansion    Cardiovascular:   Regular. Normal first and second heart sounds with no murmur. No rubs or gallops; the right carotid, radial and posterior tibial pulses are intact and the left carotid, radial and posterior tibial pulses are intact.  Jugular venous pressure is flat, no edema bilaterally    Abdomen:  no organomegaly, masses, bruits, or tenderness; bowel sounds are present   Extremities: no cyanosis or clubbing   Skin: no xanthelasma, warm.    Neurologic: normal  bilateral, no tremors     Psychiatric: alert and oriented x3, calm        Please refer above for cardiac ROS details.        Medical History  Surgical History Family History Social History   No past medical history on file.  Past Surgical History:   Procedure Laterality Date     CV CORONARY ANGIOGRAM N/A 11/6/2022    Procedure: Coronary Angiogram;  Surgeon: Mira Rodriguez MD;  Location: [facility] CV     CV INTRAVASULAR ULTRASOUND N/A 11/6/2022    Procedure: Intravascular Ultrasound;  Surgeon: Mira Rodriguez MD;  Location: [facility] CV     CV LEFT HEART CATH N/A 11/6/2022    Procedure: Left Heart Catheterization;  Surgeon: " Mira Rodriguez MD;  Location: [facility] CV     CV PCI ASPIRATION THROMECTOMY N/A 11/6/2022    Procedure: Percutaneous Coronary Intervention Aspiration Thrombectomy;  Surgeon: Mira Rodriguez MD;  Location: [facility] CV     CV PCI STENT DRUG ELUTING N/A 11/6/2022    Procedure: Percutaneous Coronary Intervention Stent;  Surgeon: Mira Rodriguez MD;  Location: [facility] LAB CV     No family history on file.     Social History     Socioeconomic History     Marital status:      Spouse name: Not on file     Number of children: Not on file     Years of education: Not on file     Highest education level: Not on file   Occupational History     Not on file   Tobacco Use     Smoking status: Never     Smokeless tobacco: Never   Vaping Use     Vaping Use: Never used   Substance and Sexual Activity     Alcohol use: Not on file     Drug use: Not on file     Sexual activity: Not on file   Other Topics Concern     Not on file   Social History Narrative     Not on file     Social Determinants of Health     Financial Resource Strain: Not on file   Food Insecurity: Not on file   Transportation Needs: Not on file   Physical Activity: Not on file   Stress: Not on file   Social Connections: Not on file   Intimate Partner Violence: Not on file   Housing Stability: Not on file           Medications  Allergies   Current Outpatient Medications   Medication Sig Dispense Refill     allopurinol (ZYLOPRIM) 300 MG tablet Take 1 tablet by mouth daily       aspirin (ASA) 81 MG chewable tablet Take 1 tablet (81 mg) by mouth daily Starting tomorrow. 30 tablet 3     atorvastatin (LIPITOR) 80 MG tablet Take 1 tablet (80 mg) by mouth daily 90 tablet 3     lisinopril (ZESTRIL) 2.5 MG tablet TAKE 1 TABLET(2.5 MG) BY MOUTH DAILY 90 tablet 1     nitroGLYcerin (NITROSTAT) 0.4 MG sublingual tablet One tablet under the tongue every 5 minutes if needed for chest pain. May repeat every 5 minutes for a maximum of 3 doses in 15  "minutes\" 25 tablet 3     ticagrelor (BRILINTA) 90 MG tablet Take 1 tablet (90 mg) by mouth 2 times daily Dose to start tomorrow morning. 180 tablet 3     No Known Allergies       Lab Results    Chemistry/lipid CBC Cardiac Enzymes/BNP/TSH/INR   Recent Labs   Lab Test 12/07/22  0944   CHOL 76   HDL 40   LDL 23   TRIG 66     Recent Labs   Lab Test 12/07/22  0944 11/07/22  0521 04/11/22  1233   LDL 23 97 83     Recent Labs   Lab Test 12/07/22  0944      POTASSIUM 4.1   CHLORIDE 104   CO2 17*   *   BUN 8.6   CR 1.01   GFRESTIMATED 86   JENNIFER 10.0     Recent Labs   Lab Test 12/07/22  0944 11/07/22  0521 11/06/22  1823   CR 1.01 0.91 0.98     Recent Labs   Lab Test 11/07/22  0521   A1C 5.7*          Recent Labs   Lab Test 12/07/22  0944   WBC 7.0   HGB 13.6   HCT 41.6   MCV 92        Recent Labs   Lab Test 12/07/22  0944 11/08/22  0357 11/07/22  0521   HGB 13.6 13.9 13.6    No results for input(s): TROPONINI in the last 91750 hours.  No results for input(s): BNP, NTBNPI, NTBNP in the last 79926 hours.  Recent Labs   Lab Test 06/21/18  1210   TSH 0.60     Recent Labs   Lab Test 11/06/22  1823   INR 1.15        Today's clinic visit entailed:  Review of prior external note(s) from - [facility] information from epic reviewed  40 minutes spent on the date of the encounter doing chart review, review of outside records, review of test results, interpretation of tests, patient visit and documentation   Provider  Link to [facility] Help Grid     The level of medical decision making during this visit was of high complexity.        Mira Rodriguez MD              Thank you for allowing me to participate in the care of your patient.      Sincerely,     Mira Rodriguez MD     [facility]  cc:   Mira Rodriguez MD  [address] [facility] [place] [address]        "

## 2023-02-22 NOTE — PROGRESS NOTES
HEART CARE ENCOUNTER CONSULTATON NOTE      [facility]  [phone]      Assessment/Recommendations   Assessment/Plan:  CAD s/p MI - doing well, asymptomatic. On aspirin and high intensity statin indefinitely and brillinta x 12 months.  Diet and exercise discussed.    Hyperlipidemia - LDL at goal on statin    Hypertension - creeping up, will double his lisinopril to 5mg daily and if his BP is still high in two weeks we can go up to 10mg daily.     Bradycardia - sinus by EKG today.  Serial changes of MI noted.    F/U 8 months (November)       History of Present Illness/Subjective    HPI: Kvng Maharaj is a 58 year old male with hypertension and a family history of early coronary disease in his father (late 30s, smoker) who suffered an inferior STEMI 11/2022. He had been roller skiing when he developed chest pain and jaw/tooth numbness. His RCA was ectatic and had a distal ulcerated, thrombotic lesion with evidence of embolization to the lateral PL branches treated with SUJEY. There was residual moderate-severe mid LAD disease. Echo EF was 45-50%. Stress test 12/2022 showed a non-transmural infarct of the inferior and inferior lateral walls with no ischemia and EF 55%.     Luis comes to cardiology today for a follow up visit. He has been doing well since his MI. Luis completed cardiac rehab without difficulty. He is back to Nordic skiing and did a marathon a few weeks ago. He was careful to keep his heart rates around 140 and felt great the whole time. There has been no chest pain, dyspnea, dizziness, fatigue, palpitations, pnd/orthopnea, or edema. He is tolerating his medication well but notes that his BP has been creeping back up. Luis says that prior to his MI he was on a much higher dose of lisinopril as well at hydrochlorothiazide. He is now vegan and has lost 20 + lbs.       Physical Examination  Review of Systems   Vitals: /76 (BP Location: Right arm, Patient Position: Sitting, Cuff  "Size: Adult Regular)   Pulse (!) 47   Resp 12   Ht 1.651 m (5' 5\")   Wt 68.5 kg (151 lb)   BMI 25.13 kg/m    BMI= Body mass index is 25.13 kg/m .  Wt Readings from Last 3 Encounters:   02/22/23 68.5 kg (151 lb)   11/14/22 78.5 kg (173 lb)   11/08/22 79.1 kg (174 lb 6.1 oz)       General Appearance:   no distress, thin body habitus   ENT/Mouth: membranes moist, no oral lesions or bleeding gums.      EYES:  no scleral icterus, normal conjunctivae   Neck: no carotid bruits or thyromegaly   Chest/Lungs:   lungs are clear to auscultation, no rales or wheezing, no sternal scar, equal chest wall expansion    Cardiovascular:   Regular. Normal first and second heart sounds with no murmur. No rubs or gallops; the right carotid, radial and posterior tibial pulses are intact and the left carotid, radial and posterior tibial pulses are intact.  Jugular venous pressure is flat, no edema bilaterally    Abdomen:  no organomegaly, masses, bruits, or tenderness; bowel sounds are present   Extremities: no cyanosis or clubbing   Skin: no xanthelasma, warm.    Neurologic: normal  bilateral, no tremors     Psychiatric: alert and oriented x3, calm        Please refer above for cardiac ROS details.        Medical History  Surgical History Family History Social History   No past medical history on file.  Past Surgical History:   Procedure Laterality Date     CV CORONARY ANGIOGRAM N/A 11/6/2022    Procedure: Coronary Angiogram;  Surgeon: Mira Rodriguez MD;  Location: [facility] CV     CV INTRAVASULAR ULTRASOUND N/A 11/6/2022    Procedure: Intravascular Ultrasound;  Surgeon: Mira Rodriguez MD;  Location: [facility] CV     CV LEFT HEART CATH N/A 11/6/2022    Procedure: Left Heart Catheterization;  Surgeon: Mira Rodriguez MD;  Location: [facility] CV     CV PCI ASPIRATION THROMECTOMY N/A 11/6/2022    Procedure: Percutaneous Coronary Intervention Aspiration Thrombectomy;  Surgeon: Mira Rodriguez MD;  " "Location: [facility] LAB CV     CV PCI STENT DRUG ELUTING N/A 11/6/2022    Procedure: Percutaneous Coronary Intervention Stent;  Surgeon: Mira Rodriguez MD;  Location: [facility] LAB CV     No family history on file.     Social History     Socioeconomic History     Marital status:      Spouse name: Not on file     Number of children: Not on file     Years of education: Not on file     Highest education level: Not on file   Occupational History     Not on file   Tobacco Use     Smoking status: Never     Smokeless tobacco: Never   Vaping Use     Vaping Use: Never used   Substance and Sexual Activity     Alcohol use: Not on file     Drug use: Not on file     Sexual activity: Not on file   Other Topics Concern     Not on file   Social History Narrative     Not on file     Social Determinants of Health     Financial Resource Strain: Not on file   Food Insecurity: Not on file   Transportation Needs: Not on file   Physical Activity: Not on file   Stress: Not on file   Social Connections: Not on file   Intimate Partner Violence: Not on file   Housing Stability: Not on file           Medications  Allergies   Current Outpatient Medications   Medication Sig Dispense Refill     allopurinol (ZYLOPRIM) 300 MG tablet Take 1 tablet by mouth daily       aspirin (ASA) 81 MG chewable tablet Take 1 tablet (81 mg) by mouth daily Starting tomorrow. 30 tablet 3     atorvastatin (LIPITOR) 80 MG tablet Take 1 tablet (80 mg) by mouth daily 90 tablet 3     lisinopril (ZESTRIL) 2.5 MG tablet TAKE 1 TABLET(2.5 MG) BY MOUTH DAILY 90 tablet 1     nitroGLYcerin (NITROSTAT) 0.4 MG sublingual tablet One tablet under the tongue every 5 minutes if needed for chest pain. May repeat every 5 minutes for a maximum of 3 doses in 15 minutes\" 25 tablet 3     ticagrelor (BRILINTA) 90 MG tablet Take 1 tablet (90 mg) by mouth 2 times daily Dose to start tomorrow morning. 180 tablet 3     No Known Allergies       Lab Results    Chemistry/lipid CBC " Cardiac Enzymes/BNP/TSH/INR   Recent Labs   Lab Test 12/07/22  0944   CHOL 76   HDL 40   LDL 23   TRIG 66     Recent Labs   Lab Test 12/07/22  0944 11/07/22  0521 04/11/22  1233   LDL 23 97 83     Recent Labs   Lab Test 12/07/22  0944      POTASSIUM 4.1   CHLORIDE 104   CO2 17*   *   BUN 8.6   CR 1.01   GFRESTIMATED 86   JENNIFER 10.0     Recent Labs   Lab Test 12/07/22  0944 11/07/22  0521 11/06/22  1823   CR 1.01 0.91 0.98     Recent Labs   Lab Test 11/07/22  0521   A1C 5.7*          Recent Labs   Lab Test 12/07/22  0944   WBC 7.0   HGB 13.6   HCT 41.6   MCV 92        Recent Labs   Lab Test 12/07/22  0944 11/08/22  0357 11/07/22  0521   HGB 13.6 13.9 13.6    No results for input(s): TROPONINI in the last 86657 hours.  No results for input(s): BNP, NTBNPI, NTBNP in the last 15715 hours.  Recent Labs   Lab Test 06/21/18  1210   TSH 0.60     Recent Labs   Lab Test 11/06/22  1823   INR 1.15        Today's clinic visit entailed:  Review of prior external note(s) from - [facility] information from epic reviewed  40 minutes spent on the date of the encounter doing chart review, review of outside records, review of test results, interpretation of tests, patient visit and documentation   Provider  Link to [facility] Help Grid     The level of medical decision making during this visit was of high complexity.        Mira Rodriguez MD

## 2023-02-23 LAB
ATRIAL RATE - MUSE: 49 BPM
DIASTOLIC BLOOD PRESSURE - MUSE: NORMAL MMHG
INTERPRETATION ECG - MUSE: NORMAL
P AXIS - MUSE: 16 DEGREES
PR INTERVAL - MUSE: 206 MS
QRS DURATION - MUSE: 78 MS
QT - MUSE: 442 MS
QTC - MUSE: 399 MS
R AXIS - MUSE: 27 DEGREES
SYSTOLIC BLOOD PRESSURE - MUSE: NORMAL MMHG
T AXIS - MUSE: -61 DEGREES
VENTRICULAR RATE- MUSE: 49 BPM

## 2023-12-06 NOTE — PROGRESS NOTES
HEART CARE ENCOUNTER NOTE      [facility]  [phone]      Assessment/Recommendations   Assessment:   Coronary artery disease: Status post acute inferior lateral STEMI November 2022 with subsequent SUJEY to distal RCA which was complicated due to vessel ectasia and heavy thrombus burden.  After angiogram could consider PCI treatment of stenosis in the LAD.  Stress test 12/19/2022 showed no new areas of ischemia and moderate damage from his previous heart attack with normal EF.  Hyperlipidemia: On atorvastatin 80 mg daily.  Last lipids 12/7/2022 showed LDL of 23.  Hypertension: Well-controlled on lisinopril 2.5 mg daily.     Plan:   Can discontinue Brilinta as patient is over a year out from his stent placement. Continue Aspirin 81 mg lifelong  Continue lisinopril and atorvastatin  BMP and fasting lipids to be done through primary at his annual follow-up  Follow-up in 1 year with new general cardiologist as Dr. Rodriguez is no longer at our practice        The level of medical decision making during this visit was of moderate complexity.       History of Present Illness/Subjective    HPI: Kvng Maharaj is a 58 year old male with PMHx of hypertension, hyperlipidemia, family history of early coronary artery disease in his father, coronary artery disease status post inferior STEMI November 2022 with angiograms showing RCA was ectatic and had distal ulcerated, thrombolic lesion with evidence of embolization to the lateral PL branches treated with SUJEY.  There was residual moderate to severe mid LAD disease.  Echo ejection fraction showed 45 to 50% and nuclear stress test December 2022 showed nontransmural infarct of the inferior and inferior lateral walls with no ischemia and EF of 55%.  Patient presents today for follow-up.    Patient notes he has been feeling great since his heart attack and stent placement.  Patient notes he is back to his normal activity doing Nordic skiing, [employer], Trail  running without any symptoms.  Patient notes his heart rate will be in the 130s and 140s while working out and feels good at this heart rate.  Will occasionally go slightly higher but not maintained for the whole workout.  Patient notes the symptoms that brought him in for his heart attack or discomfort in his chest and cold teeth while rollerskating when she has not had recurrence of.  Patient notes he is eating healthier now as well.  He denies fatigue, lightheadedness, shortness of breath, dyspnea on exertion, orthopnea, PND, palpitations, chest pain, abdominal fullness/bloating, and lower extremity edema.        Nuclear exercise stress test 12/19/2022:    There is no prior study for comparison.    The patient achieved good exercise of workload without inducible angina.    Exercise stress ECG is nondiagnostic due to baseline ECG abnormality.    Exercise nuclear stress test is abnormal.  There is a medium sized area of a moderate degree of nontransmural infarction in the mid to basal inferior and inferolateral segment(s) of the left ventricle.  There is no reversible change indicating inducible myocardial ischemia.    Normal left ventricular size with severe hypokinesis in the basal inferior and basal inferolateral segment.  The calculated left ventricular ejection is 55%.    The patient is at a low risk of future cardiac ischemic events.    Echocardiogram 11/7/22 Results:  Normal left ventricular size. Left ventricular ejection fraction is mildly  reduced. Left ventricular ejection fraction estimated 45 to 50%. The distal  septum and apex appears hypokinetic to akinetic. The basilar inferior wall  appears mildly hypokinetic.  Diastolic Doppler findings (E/E' ratio and/or other parameters) suggest left  ventricular filling pressures are normal.  Normal right ventricular size and systolic function  No observed hemodynamically significant valve abnormality  The proximal ascending aorta measures mildly enlarged at  "4.0 cm.    Coronary angiogram 11/6/2022:  Acute inferior and lateral STEMI in a patient with a family history of premature coronary disease.  Patent left main.  There is diffuse mild LAD disease with a focal moderate-severe mid LAD lesion. The first diagonal is a large branching system with mild-moderate disease.  The circumflex is grossly normal.  The RCA is a large ectatic artery with a distal RCA ulcerated and thrombotic stenosis with evidence of distal emboli to the two lateral PL branches. We started with thrombectomy, followed by IVUS for stent sizing and then stenting with overlapping Megatron 5.0 mm SUJEY dilated to 5.5 mm. There was further distal embolization to the PDA during the stenting. Intracoronary adenosine 300mcg and ntg 200mg were given through a twin pass catheter in the distal RCA with some improvement in the distal PDA flow, but not the PL branches. Integrillin bolus x 2 given and gtt started, as was a low dose NTG gtt to augment microvascular flow and clot break down. Chest pain remained 3/10 and did not change during the procedure. Challenging PCI due to vessel ectasia and heavy thrombus burden.  LV EDP 11 mmHg.  - Consider PCI treatment of stenosis in the LAD.         Physical Examination  Review of Systems   Vitals: /72 (BP Location: Right arm, Patient Position: Sitting, Cuff Size: Adult Regular)   Pulse 61   Resp (!) 97   Ht 1.651 m (5' 5\")   Wt 68 kg (150 lb)   BMI 24.96 kg/m    BMI= Body mass index is 24.96 kg/m .  Wt Readings from Last 3 Encounters:   12/07/23 68 kg (150 lb)   02/22/23 68.5 kg (151 lb)   11/14/22 78.5 kg (173 lb)           ENT/Mouth: membranes moist, no oral lesions or bleeding gums.      EYES:  no scleral icterus, normal conjunctivae                    Neck: No carotid bruit or thyromegaly   Chest/Lungs:   lungs are clear to auscultation, no rales or wheezing,  equal chest wall expansion    Cardiovascular:   Regular. Normal first and second heart sounds with " no murmurs, rubs, or gallops; the carotid, radial pulses are intact, Jugular venous pressure normal, no edema bilaterally        Extremities: no cyanosis or clubbing   Skin: no xanthelasma, warm.    Neurologic: no tremors     Psychiatric: alert and oriented x3, calm        Please refer above for cardiac ROS details.        Medical History  Surgical History Family History Social History   Past Medical History:   Diagnosis Date    Congestive heart failure (H)     Coronary artery disease     Hyperlipidemia     Myocardial infarction (H)      Past Surgical History:   Procedure Laterality Date    CV CORONARY ANGIOGRAM N/A 11/06/2022    Procedure: Coronary Angiogram;  Surgeon: Mira Rodriguez MD;  Location: [facility] CV    CV INTRAVASULAR ULTRASOUND N/A 11/06/2022    Procedure: Intravascular Ultrasound;  Surgeon: Mira Rodrgiuez MD;  Location: [facility] CV    CV LEFT HEART CATH N/A 11/06/2022    Procedure: Left Heart Catheterization;  Surgeon: Mira Rodriguez MD;  Location: [facility] CV    CV PCI ASPIRATION THROMECTOMY N/A 11/06/2022    Procedure: Percutaneous Coronary Intervention Aspiration Thrombectomy;  Surgeon: Mira Rodriguez MD;  Location: [facility]    CV PCI STENT DRUG ELUTING N/A 11/06/2022    Procedure: Percutaneous Coronary Intervention Stent;  Surgeon: Mira Rodriguez MD;  Location: [facility]    HEART CATH, ANGIOPLASTY       History reviewed. No pertinent family history.     Social History     Socioeconomic History    Marital status:      Spouse name: Not on file    Number of children: Not on file    Years of education: Not on file    Highest education level: Not on file   Occupational History    Not on file   Tobacco Use    Smoking status: Never    Smokeless tobacco: Never   Vaping Use    Vaping Use: Never used   Substance and Sexual Activity    Alcohol use: Not on file    Drug use: Not on file    Sexual activity: Not on file   Other Topics Concern     "Not on file   Social History Narrative    Not on file     Social Determinants of Health     Financial Resource Strain: Not on file   Food Insecurity: Not on file   Transportation Needs: Not on file   Physical Activity: Not on file   Stress: Not on file   Social Connections: Not on file   Interpersonal Safety: Not on file   Housing Stability: Not on file           Medications  Allergies   Current Outpatient Medications   Medication Sig Dispense Refill    allopurinol (ZYLOPRIM) 300 MG tablet Take 1 tablet by mouth daily      aspirin (ASA) 81 MG chewable tablet Take 1 tablet (81 mg) by mouth daily Starting tomorrow. 30 tablet 3    atorvastatin (LIPITOR) 80 MG tablet Take 1 tablet (80 mg) by mouth daily 90 tablet 3    lisinopril (ZESTRIL) 2.5 MG tablet TAKE 1 TABLET(2.5 MG) BY MOUTH DAILY 90 tablet 1    nitroGLYcerin (NITROSTAT) 0.4 MG sublingual tablet One tablet under the tongue every 5 minutes if needed for chest pain. May repeat every 5 minutes for a maximum of 3 doses in 15 minutes\" 25 tablet 3    ticagrelor (BRILINTA) 90 MG tablet Take 1 tablet (90 mg) by mouth 2 times daily 180 tablet 0     No Known Allergies       Lab Results    Chemistry/lipid CBC Cardiac Enzymes/BNP/TSH/INR   Recent Labs   Lab Test 12/07/22  0944   CHOL 76   HDL 40   LDL 23   TRIG 66     Recent Labs   Lab Test 12/07/22  0944 11/07/22  0521 04/11/22  1233   LDL 23 97 83     Recent Labs   Lab Test 12/07/22 0944      POTASSIUM 4.1   CHLORIDE 104   CO2 17*   *   BUN 8.6   CR 1.01   GFRESTIMATED 86   JENNIFER 10.0     Recent Labs   Lab Test 12/07/22  0944 11/07/22  0521 11/06/22  1823   CR 1.01 0.91 0.98     Recent Labs   Lab Test 11/07/22  0521   A1C 5.7*          Recent Labs   Lab Test 12/07/22  0944   WBC 7.0   HGB 13.6   HCT 41.6   MCV 92        Recent Labs   Lab Test 12/07/22  0944 11/08/22  0357 11/07/22 0521   HGB 13.6 13.9 13.6    No results for input(s): \"TROPONINI\" in the last 32349 hours.  No results for input(s): \"BNP\", " "\"NTBNPI\", \"NTBNP\" in the last 28659 hours.  Recent Labs   Lab Test 06/21/18  1210   TSH 0.60     Recent Labs   Lab Test 11/06/22  1823   INR 1.15        Alem Duong PA-C                                       "

## 2023-12-07 ENCOUNTER — OFFICE VISIT (OUTPATIENT)
Dept: CARDIOLOGY | Facility: CLINIC | Age: 58
End: 2023-12-07
Payer: COMMERCIAL

## 2023-12-07 VITALS
DIASTOLIC BLOOD PRESSURE: 72 MMHG | SYSTOLIC BLOOD PRESSURE: 108 MMHG | BODY MASS INDEX: 24.99 KG/M2 | WEIGHT: 150 LBS | RESPIRATION RATE: 97 BRPM | HEIGHT: 65 IN | HEART RATE: 61 BPM

## 2023-12-07 DIAGNOSIS — I21.3 ST ELEVATION MYOCARDIAL INFARCTION (STEMI), UNSPECIFIED ARTERY (H): ICD-10-CM

## 2023-12-07 DIAGNOSIS — E78.5 DYSLIPIDEMIA, GOAL LDL BELOW 70: ICD-10-CM

## 2023-12-07 DIAGNOSIS — I25.10 CORONARY ARTERY DISEASE INVOLVING NATIVE CORONARY ARTERY OF NATIVE HEART WITHOUT ANGINA PECTORIS: Primary | ICD-10-CM

## 2023-12-07 DIAGNOSIS — I10 PRIMARY HYPERTENSION: ICD-10-CM

## 2023-12-07 PROCEDURE — 99214 OFFICE O/P EST MOD 30 MIN: CPT | Performed by: STUDENT IN AN ORGANIZED HEALTH CARE EDUCATION/TRAINING PROGRAM

## 2023-12-07 NOTE — PATIENT INSTRUCTIONS
Kvng Maharaj,    It was a pleasure to see you today at the [facility].     My recommendations after this visit include:    - Continue current medications- Can discontinue Brilinta.  - BMP and lipids through PCP  - Follow up in 1 year with new general cardiologist as Dr. Rodriguez is no longer with our practice.     - Please call [phone], if you have any questions or concerns      Alem Duong PA-C

## 2023-12-07 NOTE — LETTER
12/7/2023    Scooby Desouza MD  [address]  [facility] [address]    RE: Kvng Maharaj       Dear Colleague,     I had the pleasure of seeing Kvng Maharaj in the [facility].    HEART CARE ENCOUNTER NOTE      [facility]  [phone]      Assessment/Recommendations   Assessment:   Coronary artery disease: Status post acute inferior lateral STEMI November 2022 with subsequent SUJEY to distal RCA which was complicated due to vessel ectasia and heavy thrombus burden.  After angiogram could consider PCI treatment of stenosis in the LAD.  Stress test 12/19/2022 showed no new areas of ischemia and moderate damage from his previous heart attack with normal EF.  Hyperlipidemia: On atorvastatin 80 mg daily.  Last lipids 12/7/2022 showed LDL of 23.  Hypertension: Well-controlled on lisinopril 2.5 mg daily.     Plan:   Can discontinue Brilinta as patient is over a year out from his stent placement. Continue Aspirin 81 mg lifelong  Continue lisinopril and atorvastatin  BMP and fasting lipids to be done through primary at his annual follow-up  Follow-up in 1 year with new general cardiologist as Dr. Rodriguez is no longer at our practice        The level of medical decision making during this visit was of moderate complexity.       History of Present Illness/Subjective    HPI: Kvng Maharaj is a 58 year old male with PMHx of hypertension, hyperlipidemia, family history of early coronary artery disease in his father, coronary artery disease status post inferior STEMI November 2022 with angiograms showing RCA was ectatic and had distal ulcerated, thrombolic lesion with evidence of embolization to the lateral PL branches treated with SUJEY.  There was residual moderate to severe mid LAD disease.  Echo ejection fraction showed 45 to 50% and nuclear stress test December 2022 showed nontransmural infarct of the inferior and inferior lateral walls with no ischemia and EF of 55%.  Patient  presents today for follow-up.    Patient notes he has been feeling great since his heart attack and stent placement.  Patient notes he is back to his normal activity doing Nordic skiing, rollerskating, Trail running without any symptoms.  Patient notes his heart rate will be in the 130s and 140s while working out and feels good at this heart rate.  Will occasionally go slightly higher but not maintained for the whole workout.  Patient notes the symptoms that brought him in for his heart attack or discomfort in his chest and cold teeth while rollerskating when she has not had recurrence of.  Patient notes he is eating healthier now as well.  He denies fatigue, lightheadedness, shortness of breath, dyspnea on exertion, orthopnea, PND, palpitations, chest pain, abdominal fullness/bloating, and lower extremity edema.        Nuclear exercise stress test 12/19/2022:    There is no prior study for comparison.    The patient achieved good exercise of workload without inducible angina.    Exercise stress ECG is nondiagnostic due to baseline ECG abnormality.    Exercise nuclear stress test is abnormal.  There is a medium sized area of a moderate degree of nontransmural infarction in the mid to basal inferior and inferolateral segment(s) of the left ventricle.  There is no reversible change indicating inducible myocardial ischemia.    Normal left ventricular size with severe hypokinesis in the basal inferior and basal inferolateral segment.  The calculated left ventricular ejection is 55%.    The patient is at a low risk of future cardiac ischemic events.    Echocardiogram 11/7/22 Results:  Normal left ventricular size. Left ventricular ejection fraction is mildly  reduced. Left ventricular ejection fraction estimated 45 to 50%. The distal  septum and apex appears hypokinetic to akinetic. The basilar inferior wall  appears mildly hypokinetic.  Diastolic Doppler findings (E/E' ratio and/or other parameters) suggest  "left  ventricular filling pressures are normal.  Normal right ventricular size and systolic function  No observed hemodynamically significant valve abnormality  The proximal ascending aorta measures mildly enlarged at 4.0 cm.    Coronary angiogram 11/6/2022:  Acute inferior and lateral STEMI in a patient with a family history of premature coronary disease.  Patent left main.  There is diffuse mild LAD disease with a focal moderate-severe mid LAD lesion. The first diagonal is a large branching system with mild-moderate disease.  The circumflex is grossly normal.  The RCA is a large ectatic artery with a distal RCA ulcerated and thrombotic stenosis with evidence of distal emboli to the two lateral PL branches. We started with thrombectomy, followed by IVUS for stent sizing and then stenting with overlapping Megatron 5.0 mm SUJEY dilated to 5.5 mm. There was further distal embolization to the PDA during the stenting. Intracoronary adenosine 300mcg and ntg 200mg were given through a twin pass catheter in the distal RCA with some improvement in the distal PDA flow, but not the PL branches. Integrillin bolus x 2 given and gtt started, as was a low dose NTG gtt to augment microvascular flow and clot break down. Chest pain remained 3/10 and did not change during the procedure. Challenging PCI due to vessel ectasia and heavy thrombus burden.  LV EDP 11 mmHg.  - Consider PCI treatment of stenosis in the LAD.         Physical Examination  Review of Systems   Vitals: /72 (BP Location: Right arm, Patient Position: Sitting, Cuff Size: Adult Regular)   Pulse 61   Resp (!) 97   Ht 1.651 m (5' 5\")   Wt 68 kg (150 lb)   BMI 24.96 kg/m    BMI= Body mass index is 24.96 kg/m .  Wt Readings from Last 3 Encounters:   12/07/23 68 kg (150 lb)   02/22/23 68.5 kg (151 lb)   11/14/22 78.5 kg (173 lb)           ENT/Mouth: membranes moist, no oral lesions or bleeding gums.      EYES:  no scleral icterus, normal conjunctivae              "       Neck: No carotid bruit or thyromegaly   Chest/Lungs:   lungs are clear to auscultation, no rales or wheezing,  equal chest wall expansion    Cardiovascular:   Regular. Normal first and second heart sounds with no murmurs, rubs, or gallops; the carotid, radial pulses are intact, Jugular venous pressure normal, no edema bilaterally        Extremities: no cyanosis or clubbing   Skin: no xanthelasma, warm.    Neurologic: no tremors     Psychiatric: alert and oriented x3, calm        Please refer above for cardiac ROS details.        Medical History  Surgical History Family History Social History   Past Medical History:   Diagnosis Date    Congestive heart failure (H)     Coronary artery disease     Hyperlipidemia     Myocardial infarction (H)      Past Surgical History:   Procedure Laterality Date    CV CORONARY ANGIOGRAM N/A 11/06/2022    Procedure: Coronary Angiogram;  Surgeon: Mira Rodriguez MD;  Location: [facility] CV    CV INTRAVASULAR ULTRASOUND N/A 11/06/2022    Procedure: Intravascular Ultrasound;  Surgeon: Mira Rodriguez MD;  Location: [facility] CV    CV LEFT HEART CATH N/A 11/06/2022    Procedure: Left Heart Catheterization;  Surgeon: Mira Rodriguez MD;  Location: [facility]    CV PCI ASPIRATION THROMECTOMY N/A 11/06/2022    Procedure: Percutaneous Coronary Intervention Aspiration Thrombectomy;  Surgeon: Mira Rodriguez MD;  Location: [facility]    CV PCI STENT DRUG ELUTING N/A 11/06/2022    Procedure: Percutaneous Coronary Intervention Stent;  Surgeon: Mira Rodriguez MD;  Location: [facility] CV    HEART CATH, ANGIOPLASTY       History reviewed. No pertinent family history.     Social History     Socioeconomic History    Marital status:      Spouse name: Not on file    Number of children: Not on file    Years of education: Not on file    Highest education level: Not on file   Occupational History    Not on file   Tobacco Use    Smoking status:  "Never    Smokeless tobacco: Never   Vaping Use    Vaping Use: Never used   Substance and Sexual Activity    Alcohol use: Not on file    Drug use: Not on file    Sexual activity: Not on file   Other Topics Concern    Not on file   Social History Narrative    Not on file     Social Determinants of Health     Financial Resource Strain: Not on file   Food Insecurity: Not on file   Transportation Needs: Not on file   Physical Activity: Not on file   Stress: Not on file   Social Connections: Not on file   Interpersonal Safety: Not on file   Housing Stability: Not on file           Medications  Allergies   Current Outpatient Medications   Medication Sig Dispense Refill    allopurinol (ZYLOPRIM) 300 MG tablet Take 1 tablet by mouth daily      aspirin (ASA) 81 MG chewable tablet Take 1 tablet (81 mg) by mouth daily Starting tomorrow. 30 tablet 3    atorvastatin (LIPITOR) 80 MG tablet Take 1 tablet (80 mg) by mouth daily 90 tablet 3    lisinopril (ZESTRIL) 2.5 MG tablet TAKE 1 TABLET(2.5 MG) BY MOUTH DAILY 90 tablet 1    nitroGLYcerin (NITROSTAT) 0.4 MG sublingual tablet One tablet under the tongue every 5 minutes if needed for chest pain. May repeat every 5 minutes for a maximum of 3 doses in 15 minutes\" 25 tablet 3    ticagrelor (BRILINTA) 90 MG tablet Take 1 tablet (90 mg) by mouth 2 times daily 180 tablet 0     No Known Allergies       Lab Results    Chemistry/lipid CBC Cardiac Enzymes/BNP/TSH/INR   Recent Labs   Lab Test 12/07/22  0944   CHOL 76   HDL 40   LDL 23   TRIG 66     Recent Labs   Lab Test 12/07/22  0944 11/07/22  0521 04/11/22  1233   LDL 23 97 83     Recent Labs   Lab Test 12/07/22  0944      POTASSIUM 4.1   CHLORIDE 104   CO2 17*   *   BUN 8.6   CR 1.01   GFRESTIMATED 86   JENNIFER 10.0     Recent Labs   Lab Test 12/07/22  0944 11/07/22  0521 11/06/22  1823   CR 1.01 0.91 0.98     Recent Labs   Lab Test 11/07/22  0521   A1C 5.7*          Recent Labs   Lab Test 12/07/22  0944   WBC 7.0   HGB 13.6 " "  HCT 41.6   MCV 92        Recent Labs   Lab Test 12/07/22  0944 11/08/22  0357 11/07/22  0521   HGB 13.6 13.9 13.6    No results for input(s): \"TROPONINI\" in the last 46710 hours.  No results for input(s): \"BNP\", \"NTBNPI\", \"NTBNP\" in the last 65693 hours.  Recent Labs   Lab Test 06/21/18  1210   TSH 0.60     Recent Labs   Lab Test 11/06/22  1823   INR 1.15        Alem Duong PA-C          Thank you for allowing me to participate in the care of your patient.      Sincerely,     Alem Duong PA-C     [facility]  cc:   No referring provider defined for this encounter.      "

## 2023-12-14 ENCOUNTER — LAB REQUISITION (OUTPATIENT)
Dept: LAB | Facility: CLINIC | Age: 58
End: 2023-12-14

## 2023-12-14 DIAGNOSIS — D72.829 ELEVATED WHITE BLOOD CELL COUNT, UNSPECIFIED: ICD-10-CM

## 2023-12-14 DIAGNOSIS — I25.118 ATHEROSCLEROTIC HEART DISEASE OF NATIVE CORONARY ARTERY WITH OTHER FORMS OF ANGINA PECTORIS (H): ICD-10-CM

## 2023-12-14 LAB
ALBUMIN SERPL BCG-MCNC: 4 G/DL (ref 3.5–5.2)
ALP SERPL-CCNC: 109 U/L (ref 40–150)
ALT SERPL W P-5'-P-CCNC: 39 U/L (ref 0–70)
ANION GAP SERPL CALCULATED.3IONS-SCNC: 10 MMOL/L (ref 7–15)
AST SERPL W P-5'-P-CCNC: 34 U/L (ref 0–45)
BASOPHILS # BLD AUTO: 0.1 10E3/UL (ref 0–0.2)
BASOPHILS NFR BLD AUTO: 1 %
BILIRUB SERPL-MCNC: 0.5 MG/DL
BUN SERPL-MCNC: 12.4 MG/DL (ref 8–23)
CALCIUM SERPL-MCNC: 9.5 MG/DL (ref 8.6–10)
CHLORIDE SERPL-SCNC: 105 MMOL/L (ref 98–107)
CHOLEST SERPL-MCNC: 84 MG/DL
CREAT SERPL-MCNC: 1.02 MG/DL (ref 0.67–1.17)
DEPRECATED HCO3 PLAS-SCNC: 26 MMOL/L (ref 22–29)
EGFRCR SERPLBLD CKD-EPI 2021: 85 ML/MIN/1.73M2
EOSINOPHIL # BLD AUTO: 0.1 10E3/UL (ref 0–0.7)
EOSINOPHIL NFR BLD AUTO: 1 %
ERYTHROCYTE [DISTWIDTH] IN BLOOD BY AUTOMATED COUNT: 13.5 % (ref 10–15)
FASTING STATUS PATIENT QL REPORTED: NORMAL
GLUCOSE SERPL-MCNC: 97 MG/DL (ref 70–99)
HCT VFR BLD AUTO: 39.4 % (ref 40–53)
HDLC SERPL-MCNC: 47 MG/DL
HGB BLD-MCNC: 13.3 G/DL (ref 13.3–17.7)
IMM GRANULOCYTES # BLD: 0 10E3/UL
IMM GRANULOCYTES NFR BLD: 0 %
LDLC SERPL CALC-MCNC: 27 MG/DL
LYMPHOCYTES # BLD AUTO: 2.2 10E3/UL (ref 0.8–5.3)
LYMPHOCYTES NFR BLD AUTO: 33 %
MCH RBC QN AUTO: 31.1 PG (ref 26.5–33)
MCHC RBC AUTO-ENTMCNC: 33.8 G/DL (ref 31.5–36.5)
MCV RBC AUTO: 92 FL (ref 78–100)
MONOCYTES # BLD AUTO: 0.7 10E3/UL (ref 0–1.3)
MONOCYTES NFR BLD AUTO: 10 %
NEUTROPHILS # BLD AUTO: 3.7 10E3/UL (ref 1.6–8.3)
NEUTROPHILS NFR BLD AUTO: 55 %
NONHDLC SERPL-MCNC: 37 MG/DL
NRBC # BLD AUTO: 0 10E3/UL
NRBC BLD AUTO-RTO: 0 /100
PLATELET # BLD AUTO: 221 10E3/UL (ref 150–450)
POTASSIUM SERPL-SCNC: 4 MMOL/L (ref 3.4–5.3)
PROT SERPL-MCNC: 6.5 G/DL (ref 6.4–8.3)
RBC # BLD AUTO: 4.27 10E6/UL (ref 4.4–5.9)
SODIUM SERPL-SCNC: 141 MMOL/L (ref 135–145)
TRIGL SERPL-MCNC: 50 MG/DL
WBC # BLD AUTO: 6.7 10E3/UL (ref 4–11)

## 2023-12-14 PROCEDURE — 80053 COMPREHEN METABOLIC PANEL: CPT | Performed by: FAMILY MEDICINE

## 2023-12-14 PROCEDURE — 80061 LIPID PANEL: CPT | Performed by: FAMILY MEDICINE

## 2023-12-14 PROCEDURE — 85025 COMPLETE CBC W/AUTO DIFF WBC: CPT | Performed by: FAMILY MEDICINE

## 2024-01-05 DIAGNOSIS — I25.5 ISCHEMIC CARDIOMYOPATHY: ICD-10-CM

## 2024-01-05 RX ORDER — LISINOPRIL 2.5 MG/1
TABLET ORAL
Qty: 90 TABLET | Refills: 2 | Status: SHIPPED | OUTPATIENT
Start: 2024-01-05 | End: 2024-10-07

## 2024-01-16 ENCOUNTER — LAB REQUISITION (OUTPATIENT)
Dept: LAB | Facility: CLINIC | Age: 59
End: 2024-01-16

## 2024-01-16 DIAGNOSIS — D64.9 ANEMIA, UNSPECIFIED: ICD-10-CM

## 2024-01-16 DIAGNOSIS — Z00.00 ENCOUNTER FOR GENERAL ADULT MEDICAL EXAMINATION WITHOUT ABNORMAL FINDINGS: ICD-10-CM

## 2024-01-16 DIAGNOSIS — Z87.39 PERSONAL HISTORY OF OTHER DISEASES OF THE MUSCULOSKELETAL SYSTEM AND CONNECTIVE TISSUE: ICD-10-CM

## 2024-01-16 PROCEDURE — G0103 PSA SCREENING: HCPCS | Performed by: FAMILY MEDICINE

## 2024-01-16 PROCEDURE — 83550 IRON BINDING TEST: CPT | Performed by: FAMILY MEDICINE

## 2024-01-16 PROCEDURE — 84550 ASSAY OF BLOOD/URIC ACID: CPT | Performed by: FAMILY MEDICINE

## 2024-01-17 LAB
IRON BINDING CAPACITY (ROCHE): 266 UG/DL (ref 240–430)
IRON SATN MFR SERPL: 44 % (ref 15–46)
IRON SERPL-MCNC: 118 UG/DL (ref 61–157)
PSA SERPL DL<=0.01 NG/ML-MCNC: 0.5 NG/ML (ref 0–3.5)
URATE SERPL-MCNC: 7.5 MG/DL (ref 3.4–7)

## 2024-01-21 DIAGNOSIS — I21.3 ST ELEVATION MYOCARDIAL INFARCTION (STEMI), UNSPECIFIED ARTERY (H): ICD-10-CM

## 2024-01-22 RX ORDER — ATORVASTATIN CALCIUM 80 MG/1
80 TABLET, FILM COATED ORAL DAILY
Qty: 90 TABLET | Refills: 3 | Status: SHIPPED | OUTPATIENT
Start: 2024-01-22

## 2024-03-02 ENCOUNTER — HEALTH MAINTENANCE LETTER (OUTPATIENT)
Age: 59
End: 2024-03-02

## 2024-10-07 DIAGNOSIS — I25.5 ISCHEMIC CARDIOMYOPATHY: ICD-10-CM

## 2024-10-07 RX ORDER — LISINOPRIL 2.5 MG/1
2.5 TABLET ORAL DAILY
Qty: 90 TABLET | Refills: 0 | Status: SHIPPED | OUTPATIENT
Start: 2024-10-07

## 2024-12-22 NOTE — PROGRESS NOTES
HEART CARE ENCOUNTER NOTE      [facility]  [phone]      Assessment/Recommendations   Assessment:   Coronary artery disease: Status post acute inferior lateral STEMI November 2022 with subsequent SUJEY to distal RCA which was complicated due to vessel ectasia and heavy thrombus burden.  After angiogram could consider PCI treatment of stenosis in the LAD.  Stress test 12/19/2022 showed no new areas of ischemia and moderate damage from his previous heart attack with normal EF.  Patient denies any anginal symptoms  Hyperlipidemia: Well-controlled on atorvastatin 80 mg daily.  Last lipids 12/14/2023 showed LDL of 27.  Hypertension: On lisinopril 2.5 mg daily.  Patient notes blood pressures have been in the mid 130s systolic.  Will increase lisinopril to 5 mg daily    Plan:   Increase lisinopril to 5 mg daily for better blood pressure control  BMP in 2 weeks  Continue atorvastatin aspirin 81 mg daily  Continue staying active    Follow up 1 year with new general cardiologist.  (Previous Dr. Rodriguez patient)     History of Present Illness/Subjective    HPI: Kvng Maharaj is a 58 year old male with PMHx of hypertension, hyperlipidemia, family history of early coronary artery disease in his father, coronary artery disease status post inferior STEMI November 2022 with angiograms showing RCA was ectatic and had distal ulcerated, thrombolic lesion with evidence of embolization to the lateral PL branches treated with SUJEY.  There was residual moderate to severe mid LAD disease.  Echo ejection fraction showed 45 to 50% and nuclear stress test December 2022 showed nontransmural infarct of the inferior and inferior lateral walls with no ischemia and EF of 55%. I last saw patient 12/23/23 where patient had been feeling well and was very active without symptoms.     Patient notes he has been doing well and denies any anginal symptoms.  Patient notes the symptoms that brought him in for his heart attack were  discomfort in his chest and cold teeth while rollerskating which have not recurred.  Patient is very active denying any exertional symptoms.  Patient notes blood pressures have been slightly elevated in the mid 130s systolic.  Typically checks once a week.  He denies fatigue, lightheadedness, shortness of breath, dyspnea on exertion, orthopnea, PND, palpitations, chest pain, abdominal fullness/bloating, and lower extremity edema.          Nuclear exercise stress test 12/19/2022:    There is no prior study for comparison.    The patient achieved good exercise of workload without inducible angina.    Exercise stress ECG is nondiagnostic due to baseline ECG abnormality.    Exercise nuclear stress test is abnormal.  There is a medium sized area of a moderate degree of nontransmural infarction in the mid to basal inferior and inferolateral segment(s) of the left ventricle.  There is no reversible change indicating inducible myocardial ischemia.    Normal left ventricular size with severe hypokinesis in the basal inferior and basal inferolateral segment.  The calculated left ventricular ejection is 55%.    The patient is at a low risk of future cardiac ischemic events.    Echocardiogram 11/7/22 Results:  Normal left ventricular size. Left ventricular ejection fraction is mildly  reduced. Left ventricular ejection fraction estimated 45 to 50%. The distal  septum and apex appears hypokinetic to akinetic. The basilar inferior wall  appears mildly hypokinetic.  Diastolic Doppler findings (E/E' ratio and/or other parameters) suggest left  ventricular filling pressures are normal.  Normal right ventricular size and systolic function  No observed hemodynamically significant valve abnormality  The proximal ascending aorta measures mildly enlarged at 4.0 cm.    Coronary angiogram 11/6/2022:  Acute inferior and lateral STEMI in a patient with a family history of premature coronary disease.  Patent left main.  There is diffuse mild  "LAD disease with a focal moderate-severe mid LAD lesion. The first diagonal is a large branching system with mild-moderate disease.  The circumflex is grossly normal.  The RCA is a large ectatic artery with a distal RCA ulcerated and thrombotic stenosis with evidence of distal emboli to the two lateral PL branches. We started with thrombectomy, followed by IVUS for stent sizing and then stenting with overlapping Megatron 5.0 mm SUJEY dilated to 5.5 mm. There was further distal embolization to the PDA during the stenting. Intracoronary adenosine 300mcg and ntg 200mg were given through a twin pass catheter in the distal RCA with some improvement in the distal PDA flow, but not the PL branches. Integrillin bolus x 2 given and gtt started, as was a low dose NTG gtt to augment microvascular flow and clot break down. Chest pain remained 3/10 and did not change during the procedure. Challenging PCI due to vessel ectasia and heavy thrombus burden.  LV EDP 11 mmHg.  - Consider PCI treatment of stenosis in the LAD.         Physical Examination  Review of Systems   Vitals: /78 (BP Location: Right arm, Patient Position: Sitting, Cuff Size: Adult Regular)   Pulse 56   Resp 16   Ht 1.626 m (5' 4\")   Wt 68.3 kg (150 lb 9.6 oz)   SpO2 97%   BMI 25.85 kg/m    BMI= Body mass index is 25.85 kg/m .  Wt Readings from Last 3 Encounters:   12/23/24 68.3 kg (150 lb 9.6 oz)   12/07/23 68 kg (150 lb)   02/22/23 68.5 kg (151 lb)           ENT/Mouth: membranes moist, no oral lesions or bleeding gums.      EYES:  no scleral icterus, normal conjunctivae                    Neck: No carotid bruit or thyromegaly   Chest/Lungs:   lungs are clear to auscultation, no rales or wheezing,  equal chest wall expansion    Cardiovascular:   Regular. Normal first and second heart sounds with no murmurs, rubs, or gallops; the carotid, radial pulses are intact, Jugular venous pressure normal, no edema bilaterally        Extremities: no cyanosis or " clubbing   Skin: no xanthelasma, warm.    Neurologic: no tremors     Psychiatric: alert and oriented x3, calm        Please refer above for cardiac ROS details.        Medical History  Surgical History Family History Social History   Past Medical History:   Diagnosis Date    Congestive heart failure (H)     Coronary artery disease     Hyperlipidemia     Myocardial infarction (H)      Past Surgical History:   Procedure Laterality Date    CV CORONARY ANGIOGRAM N/A 11/06/2022    Procedure: Coronary Angiogram;  Surgeon: Mira Rodriguez MD;  Location: [facility] CV    CV INTRAVASULAR ULTRASOUND N/A 11/06/2022    Procedure: Intravascular Ultrasound;  Surgeon: Mira Rodriguez MD;  Location: [facility]    CV LEFT HEART CATH N/A 11/06/2022    Procedure: Left Heart Catheterization;  Surgeon: Mira Rodriguez MD;  Location: [facility]    CV PCI ASPIRATION THROMECTOMY N/A 11/06/2022    Procedure: Percutaneous Coronary Intervention Aspiration Thrombectomy;  Surgeon: Mira Rodriguez MD;  Location: [facility]    CV PCI STENT DRUG ELUTING N/A 11/06/2022    Procedure: Percutaneous Coronary Intervention Stent;  Surgeon: Mira Rodriguez MD;  Location: [facility] CV    HEART CATH, ANGIOPLASTY       No family history on file.     Social History     Socioeconomic History    Marital status:      Spouse name: Not on file    Number of children: Not on file    Years of education: Not on file    Highest education level: Not on file   Occupational History    Not on file   Tobacco Use    Smoking status: Never    Smokeless tobacco: Never   Vaping Use    Vaping status: Never Used   Substance and Sexual Activity    Alcohol use: Not on file    Drug use: Not on file    Sexual activity: Not on file   Other Topics Concern    Not on file   Social History Narrative    Not on file     Social Drivers of Health     Financial Resource Strain: Not on file   Food Insecurity: Not on file   Transportation  "Needs: Not on file   Physical Activity: Not on file   Stress: Not on file   Social Connections: Not on file   Interpersonal Safety: Not on file   Housing Stability: Not on file           Medications  Allergies   Current Outpatient Medications   Medication Sig Dispense Refill    allopurinol (ZYLOPRIM) 300 MG tablet Take 1 tablet by mouth daily      aspirin (ASA) 81 MG chewable tablet Take 1 tablet (81 mg) by mouth daily Starting tomorrow. 30 tablet 3    atorvastatin (LIPITOR) 80 MG tablet TAKE 1 TABLET(80 MG) BY MOUTH DAILY 90 tablet 3    lisinopril (ZESTRIL) 2.5 MG tablet Take 1 tablet (2.5 mg) by mouth daily. 90 tablet 0    nitroGLYcerin (NITROSTAT) 0.4 MG sublingual tablet One tablet under the tongue every 5 minutes if needed for chest pain. May repeat every 5 minutes for a maximum of 3 doses in 15 minutes\" 25 tablet 3    ticagrelor (BRILINTA) 90 MG tablet Take 1 tablet (90 mg) by mouth 2 times daily 180 tablet 0     No Known Allergies       Lab Results    Chemistry/lipid CBC Cardiac Enzymes/BNP/TSH/INR   Recent Labs   Lab Test 12/07/22  0944   CHOL 76   HDL 40   LDL 23   TRIG 66     Recent Labs   Lab Test 12/07/22  0944 11/07/22  0521 04/11/22  1233   LDL 23 97 83     Recent Labs   Lab Test 12/07/22  0944      POTASSIUM 4.1   CHLORIDE 104   CO2 17*   *   BUN 8.6   CR 1.01   GFRESTIMATED 86   JENNIFER 10.0     Recent Labs   Lab Test 12/07/22  0944 11/07/22  0521 11/06/22  1823   CR 1.01 0.91 0.98     Recent Labs   Lab Test 11/07/22 0521   A1C 5.7*          Recent Labs   Lab Test 12/07/22  0944   WBC 7.0   HGB 13.6   HCT 41.6   MCV 92        Recent Labs   Lab Test 12/07/22  0944 11/08/22  0357 11/07/22 0521   HGB 13.6 13.9 13.6    No results for input(s): \"TROPONINI\" in the last 63922 hours.  No results for input(s): \"BNP\", \"NTBNPI\", \"NTBNP\" in the last 14739 hours.  Recent Labs   Lab Test 06/21/18  1210   TSH 0.60     Recent Labs   Lab Test 11/06/22  1823   INR 1.15        Alem Duong, " JAMILA

## 2024-12-23 ENCOUNTER — OFFICE VISIT (OUTPATIENT)
Dept: CARDIOLOGY | Facility: CLINIC | Age: 59
End: 2024-12-23
Payer: COMMERCIAL

## 2024-12-23 VITALS
SYSTOLIC BLOOD PRESSURE: 136 MMHG | WEIGHT: 150.6 LBS | OXYGEN SATURATION: 97 % | RESPIRATION RATE: 16 BRPM | DIASTOLIC BLOOD PRESSURE: 78 MMHG | HEART RATE: 56 BPM | BODY MASS INDEX: 25.71 KG/M2 | HEIGHT: 64 IN

## 2024-12-23 DIAGNOSIS — I10 PRIMARY HYPERTENSION: ICD-10-CM

## 2024-12-23 DIAGNOSIS — I25.10 CORONARY ARTERY DISEASE INVOLVING NATIVE CORONARY ARTERY OF NATIVE HEART WITHOUT ANGINA PECTORIS: Primary | ICD-10-CM

## 2024-12-23 DIAGNOSIS — I25.5 ISCHEMIC CARDIOMYOPATHY: ICD-10-CM

## 2024-12-23 DIAGNOSIS — E78.5 DYSLIPIDEMIA, GOAL LDL BELOW 70: ICD-10-CM

## 2024-12-23 PROCEDURE — 99214 OFFICE O/P EST MOD 30 MIN: CPT | Performed by: STUDENT IN AN ORGANIZED HEALTH CARE EDUCATION/TRAINING PROGRAM

## 2024-12-23 RX ORDER — LISINOPRIL 2.5 MG/1
5 TABLET ORAL DAILY
Qty: 180 TABLET | Refills: 3 | Status: SHIPPED | OUTPATIENT
Start: 2024-12-23

## 2024-12-23 NOTE — PATIENT INSTRUCTIONS
Kvng Maharaj,    It was a pleasure to see you today at the [facility].     My recommendations after this visit include:    - Increase lisinopril to 5 mg daily (can take two 2.5 mg tablets)  - BMP in 2 weeks  - Continue staying active  - Follow up with new general cardiologist in 1 year, sooner if needed    - Please call [phone], if you have any questions or concerns      Alem Duong PA-C     Portal message sent informing patient of np results and directives

## 2024-12-23 NOTE — LETTER
12/23/2024    Scooby Desouza MD  [address]  [facility] [address]    RE: Kvng Maharaj       Dear Colleague,     I had the pleasure of seeing Kvng Maharaj in the [facility].    HEART CARE ENCOUNTER NOTE      [facility]  [phone]      Assessment/Recommendations   Assessment:   Coronary artery disease: Status post acute inferior lateral STEMI November 2022 with subsequent SUJEY to distal RCA which was complicated due to vessel ectasia and heavy thrombus burden.  After angiogram could consider PCI treatment of stenosis in the LAD.  Stress test 12/19/2022 showed no new areas of ischemia and moderate damage from his previous heart attack with normal EF.  Patient denies any anginal symptoms  Hyperlipidemia: Well-controlled on atorvastatin 80 mg daily.  Last lipids 12/14/2023 showed LDL of 27.  Hypertension: On lisinopril 2.5 mg daily.  Patient notes blood pressures have been in the mid 130s systolic.  Will increase lisinopril to 5 mg daily    Plan:   Increase lisinopril to 5 mg daily for better blood pressure control  BMP in 2 weeks  Continue atorvastatin aspirin 81 mg daily  Continue staying active    Follow up 1 year with new general cardiologist.  (Previous Dr. Rodriguez patient)     History of Present Illness/Subjective    HPI: Kvng Maharaj is a 58 year old male with PMHx of hypertension, hyperlipidemia, family history of early coronary artery disease in his father, coronary artery disease status post inferior STEMI November 2022 with angiograms showing RCA was ectatic and had distal ulcerated, thrombolic lesion with evidence of embolization to the lateral PL branches treated with SUJEY.  There was residual moderate to severe mid LAD disease.  Echo ejection fraction showed 45 to 50% and nuclear stress test December 2022 showed nontransmural infarct of the inferior and inferior lateral walls with no ischemia and EF of 55%. I last saw patient 12/23/23 where patient had  been feeling well and was very active without symptoms.     Patient notes he has been doing well and denies any anginal symptoms.  Patient notes the symptoms that brought him in for his heart attack were discomfort in his chest and cold teeth while rollerskating which have not recurred.  Patient is very active denying any exertional symptoms.  Patient notes blood pressures have been slightly elevated in the mid 130s systolic.  Typically checks once a week.  He denies fatigue, lightheadedness, shortness of breath, dyspnea on exertion, orthopnea, PND, palpitations, chest pain, abdominal fullness/bloating, and lower extremity edema.          Nuclear exercise stress test 12/19/2022:     There is no prior study for comparison.     The patient achieved good exercise of workload without inducible angina.     Exercise stress ECG is nondiagnostic due to baseline ECG abnormality.     Exercise nuclear stress test is abnormal.  There is a medium sized area of a moderate degree of nontransmural infarction in the mid to basal inferior and inferolateral segment(s) of the left ventricle.  There is no reversible change indicating inducible myocardial ischemia.     Normal left ventricular size with severe hypokinesis in the basal inferior and basal inferolateral segment.  The calculated left ventricular ejection is 55%.     The patient is at a low risk of future cardiac ischemic events.    Echocardiogram 11/7/22 Results:  Normal left ventricular size. Left ventricular ejection fraction is mildly  reduced. Left ventricular ejection fraction estimated 45 to 50%. The distal  septum and apex appears hypokinetic to akinetic. The basilar inferior wall  appears mildly hypokinetic.  Diastolic Doppler findings (E/E' ratio and/or other parameters) suggest left  ventricular filling pressures are normal.  Normal right ventricular size and systolic function  No observed hemodynamically significant valve abnormality  The proximal ascending aorta  "measures mildly enlarged at 4.0 cm.    Coronary angiogram 11/6/2022:  Acute inferior and lateral STEMI in a patient with a family history of premature coronary disease.  Patent left main.  There is diffuse mild LAD disease with a focal moderate-severe mid LAD lesion. The first diagonal is a large branching system with mild-moderate disease.  The circumflex is grossly normal.  The RCA is a large ectatic artery with a distal RCA ulcerated and thrombotic stenosis with evidence of distal emboli to the two lateral PL branches. We started with thrombectomy, followed by IVUS for stent sizing and then stenting with overlapping Megatron 5.0 mm SUJEY dilated to 5.5 mm. There was further distal embolization to the PDA during the stenting. Intracoronary adenosine 300mcg and ntg 200mg were given through a twin pass catheter in the distal RCA with some improvement in the distal PDA flow, but not the PL branches. Integrillin bolus x 2 given and gtt started, as was a low dose NTG gtt to augment microvascular flow and clot break down. Chest pain remained 3/10 and did not change during the procedure. Challenging PCI due to vessel ectasia and heavy thrombus burden.  LV EDP 11 mmHg.  - Consider PCI treatment of stenosis in the LAD.         Physical Examination  Review of Systems   Vitals: /78 (BP Location: Right arm, Patient Position: Sitting, Cuff Size: Adult Regular)   Pulse 56   Resp 16   Ht 1.626 m (5' 4\")   Wt 68.3 kg (150 lb 9.6 oz)   SpO2 97%   BMI 25.85 kg/m    BMI= Body mass index is 25.85 kg/m .  Wt Readings from Last 3 Encounters:   12/23/24 68.3 kg (150 lb 9.6 oz)   12/07/23 68 kg (150 lb)   02/22/23 68.5 kg (151 lb)           ENT/Mouth: membranes moist, no oral lesions or bleeding gums.      EYES:  no scleral icterus, normal conjunctivae                    Neck: No carotid bruit or thyromegaly   Chest/Lungs:   lungs are clear to auscultation, no rales or wheezing,  equal chest wall expansion    Cardiovascular:   " Regular. Normal first and second heart sounds with no murmurs, rubs, or gallops; the carotid, radial pulses are intact, Jugular venous pressure normal, no edema bilaterally        Extremities: no cyanosis or clubbing   Skin: no xanthelasma, warm.    Neurologic: no tremors     Psychiatric: alert and oriented x3, calm        Please refer above for cardiac ROS details.        Medical History  Surgical History Family History Social History   Past Medical History:   Diagnosis Date     Congestive heart failure (H)      Coronary artery disease      Hyperlipidemia      Myocardial infarction (H)      Past Surgical History:   Procedure Laterality Date     CV CORONARY ANGIOGRAM N/A 11/06/2022    Procedure: Coronary Angiogram;  Surgeon: Mira Rodriguez MD;  Location: [facility]     CV INTRAVASULAR ULTRASOUND N/A 11/06/2022    Procedure: Intravascular Ultrasound;  Surgeon: Mira Rodriguez MD;  Location: [facility]     CV LEFT HEART CATH N/A 11/06/2022    Procedure: Left Heart Catheterization;  Surgeon: Mira Rodriguez MD;  Location: [facility]     CV PCI ASPIRATION THROMECTOMY N/A 11/06/2022    Procedure: Percutaneous Coronary Intervention Aspiration Thrombectomy;  Surgeon: Mira Rodriguez MD;  Location: [facility]     CV PCI STENT DRUG ELUTING N/A 11/06/2022    Procedure: Percutaneous Coronary Intervention Stent;  Surgeon: Mira Rodriguez MD;  Location: [facility]     HEART CATH, ANGIOPLASTY       No family history on file.     Social History     Socioeconomic History     Marital status:      Spouse name: Not on file     Number of children: Not on file     Years of education: Not on file     Highest education level: Not on file   Occupational History     Not on file   Tobacco Use     Smoking status: Never     Smokeless tobacco: Never   Vaping Use     Vaping status: Never Used   Substance and Sexual Activity     Alcohol use: Not on file     Drug use: Not on file      "Sexual activity: Not on file   Other Topics Concern     Not on file   Social History Narrative     Not on file     Social Drivers of Health     Financial Resource Strain: Not on file   Food Insecurity: Not on file   Transportation Needs: Not on file   Physical Activity: Not on file   Stress: Not on file   Social Connections: Not on file   Interpersonal Safety: Not on file   Housing Stability: Not on file           Medications  Allergies   Current Outpatient Medications   Medication Sig Dispense Refill     allopurinol (ZYLOPRIM) 300 MG tablet Take 1 tablet by mouth daily       aspirin (ASA) 81 MG chewable tablet Take 1 tablet (81 mg) by mouth daily Starting tomorrow. 30 tablet 3     atorvastatin (LIPITOR) 80 MG tablet TAKE 1 TABLET(80 MG) BY MOUTH DAILY 90 tablet 3     lisinopril (ZESTRIL) 2.5 MG tablet Take 1 tablet (2.5 mg) by mouth daily. 90 tablet 0     nitroGLYcerin (NITROSTAT) 0.4 MG sublingual tablet One tablet under the tongue every 5 minutes if needed for chest pain. May repeat every 5 minutes for a maximum of 3 doses in 15 minutes\" 25 tablet 3     ticagrelor (BRILINTA) 90 MG tablet Take 1 tablet (90 mg) by mouth 2 times daily 180 tablet 0     No Known Allergies       Lab Results    Chemistry/lipid CBC Cardiac Enzymes/BNP/TSH/INR   Recent Labs   Lab Test 12/07/22  0944   CHOL 76   HDL 40   LDL 23   TRIG 66     Recent Labs   Lab Test 12/07/22  0944 11/07/22  0521 04/11/22  1233   LDL 23 97 83     Recent Labs   Lab Test 12/07/22  0944      POTASSIUM 4.1   CHLORIDE 104   CO2 17*   *   BUN 8.6   CR 1.01   GFRESTIMATED 86   JENNIFER 10.0     Recent Labs   Lab Test 12/07/22  0944 11/07/22  0521 11/06/22  1823   CR 1.01 0.91 0.98     Recent Labs   Lab Test 11/07/22  0521   A1C 5.7*          Recent Labs   Lab Test 12/07/22  0944   WBC 7.0   HGB 13.6   HCT 41.6   MCV 92        Recent Labs   Lab Test 12/07/22  0944 11/08/22  0357 11/07/22  0521   HGB 13.6 13.9 13.6    No results for input(s): " "\"TROPONINI\" in the last 83031 hours.  No results for input(s): \"BNP\", \"NTBNPI\", \"NTBNP\" in the last 22146 hours.  Recent Labs   Lab Test 06/21/18  1210   TSH 0.60     Recent Labs   Lab Test 11/06/22  1823   INR 1.15        Alem Duong PA-C                                         Thank you for allowing me to participate in the care of your patient.      Sincerely,     Alem Duong PA-C     [facility]  cc:   Scooby Desouza MD  [address]  [place] [address]      "

## 2025-01-06 ENCOUNTER — LAB (OUTPATIENT)
Dept: CARDIOLOGY | Facility: CLINIC | Age: 60
End: 2025-01-06
Payer: COMMERCIAL

## 2025-01-06 DIAGNOSIS — I25.5 ISCHEMIC CARDIOMYOPATHY: ICD-10-CM

## 2025-01-06 LAB
ANION GAP SERPL CALCULATED.3IONS-SCNC: 11 MMOL/L (ref 7–15)
BUN SERPL-MCNC: 16.5 MG/DL (ref 8–23)
CALCIUM SERPL-MCNC: 9.6 MG/DL (ref 8.8–10.4)
CHLORIDE SERPL-SCNC: 105 MMOL/L (ref 98–107)
CREAT SERPL-MCNC: 0.98 MG/DL (ref 0.67–1.17)
EGFRCR SERPLBLD CKD-EPI 2021: 88 ML/MIN/1.73M2
GLUCOSE SERPL-MCNC: 169 MG/DL (ref 70–99)
HCO3 SERPL-SCNC: 26 MMOL/L (ref 22–29)
POTASSIUM SERPL-SCNC: 3.9 MMOL/L (ref 3.4–5.3)
SODIUM SERPL-SCNC: 142 MMOL/L (ref 135–145)

## 2025-01-06 PROCEDURE — 80048 BASIC METABOLIC PNL TOTAL CA: CPT

## 2025-01-06 PROCEDURE — 36415 COLL VENOUS BLD VENIPUNCTURE: CPT

## 2025-01-18 DIAGNOSIS — I21.3 ST ELEVATION MYOCARDIAL INFARCTION (STEMI), UNSPECIFIED ARTERY (H): ICD-10-CM

## 2025-01-20 RX ORDER — ATORVASTATIN CALCIUM 80 MG/1
80 TABLET, FILM COATED ORAL DAILY
Qty: 90 TABLET | Refills: 2 | Status: SHIPPED | OUTPATIENT
Start: 2025-01-20

## 2025-02-22 NOTE — LETTER
[facility]  [address]  [place] [address]  [phone]          November 9, 2022    RE:  Kvng Maharaj                                                                                                                                                       [address] [address]            To whom it may concern:    Kvng Maharaj is under my professional care.  He may return to work with no restrictions on 11/15/2022      Sincerely,     Mira Rodriguez MD    
(V5) oriented

## 2025-03-15 ENCOUNTER — HEALTH MAINTENANCE LETTER (OUTPATIENT)
Age: 60
End: 2025-03-15

## 2025-04-08 NOTE — PATIENT INSTRUCTIONS
It was a pleasure seeing you at [facility] today.        Here are my suggestions for your care:    1. Increase your lisinopril to 5mg daily. Let us know what your BP is running in 2 weeks.    2. Keep up the great work with diet and exercise. Make sure to keep the HR < 140 bpm.      Let's meet again in 12 months.    You can always call my nurse Betty Avila RN who is a nurse helping me in the care of my patients. She can be reached at [phone] if you have any questions.    For scheduling, please call my  Shila Sanders at [phone].    Thank you again for trusting me with your care. Please feel free to call my office at any time if you have any question or if I can assist you in any way.    Mira Rodriguez MD  [facility]    Previously Declined (within the last year)

## (undated) DEVICE — CATH DIAG 4FR ANG PIG 538453S

## (undated) DEVICE — CATH DUAL ACCESS TWIN PASS 5200

## (undated) DEVICE — LUB INST 20ML RTGLD 6/PK H7492354800162

## (undated) DEVICE — 4FR X 10CM SHEATH, MINI-STICK MAX COAXIAL VASCULAR ENTRY KIT, 0.018IN STAINLESS STEEL TIP GUIDEWIRE, 21G X 7CM ECHOGENIC NEEDLE

## (undated) DEVICE — SHEATH GLIDE RADIAL 6FR 25CM 0.021

## (undated) DEVICE — GUIDEWIRE STARTER .035INX150CM

## (undated) DEVICE — CATH ANGIO INFINITI JL3.5 4FRX100CM 538418

## (undated) DEVICE — SLEEVE TR BAND RADIAL COMPRESSION DEVICE 24CM TRB24-REG

## (undated) DEVICE — CUSTOM PACK CORONARY SAN5BCRHEA

## (undated) DEVICE — 0.035IN X 260CM INQWIRE DIAGNOSTIC GUIDEWIRE, FIXED-CORE PTFE COATED, 3MM J-TIP

## (undated) DEVICE — SYR ANGIOGRAPHY MULTIUSE KIT ACIST 014612

## (undated) DEVICE — CATH LAUNCHER 6FR JR 5.0 LA6JR50

## (undated) DEVICE — MANIFOLD KIT ANGIO AUTOMATED 014613

## (undated) DEVICE — ELECTRODE ADULT PACING MULTI P-211-M1

## (undated) DEVICE — CANISTER ENGINE FOR INDIGO SYSTEM

## (undated) DEVICE — CATH IVUS OPTICROSS HD 3FR 1MM 135CML H74939352040

## (undated) DEVICE — GUIDEWIRE FORTE FLOPPY J TOP 34949-05J

## (undated) DEVICE — Device

## (undated) DEVICE — KIT CATHETER INDIGO RX 140CM WITH LG LUMEN ASP TUBING

## (undated) RX ORDER — FENTANYL CITRATE 50 UG/ML
INJECTION, SOLUTION INTRAMUSCULAR; INTRAVENOUS
Status: DISPENSED
Start: 2022-11-06

## (undated) RX ORDER — ADENOSINE 3 MG/ML
INJECTION, SOLUTION INTRAVENOUS
Status: DISPENSED
Start: 2022-11-06

## (undated) RX ORDER — EPTIFIBATIDE 2 MG/ML
INJECTION, SOLUTION INTRAVENOUS
Status: DISPENSED
Start: 2022-11-06